# Patient Record
Sex: MALE | Race: WHITE | Employment: OTHER | URBAN - METROPOLITAN AREA
[De-identification: names, ages, dates, MRNs, and addresses within clinical notes are randomized per-mention and may not be internally consistent; named-entity substitution may affect disease eponyms.]

---

## 2018-02-15 ENCOUNTER — HOSPITAL ENCOUNTER (OUTPATIENT)
Dept: GENERAL RADIOLOGY | Age: 63
Discharge: HOME | End: 2018-02-15
Payer: MEDICARE

## 2018-02-15 ENCOUNTER — OFFICE VISIT (OUTPATIENT)
Dept: FAMILY MEDICINE CLINIC | Age: 63
End: 2018-02-15

## 2018-02-15 VITALS
SYSTOLIC BLOOD PRESSURE: 140 MMHG | DIASTOLIC BLOOD PRESSURE: 98 MMHG | TEMPERATURE: 98.4 F | HEART RATE: 90 BPM | OXYGEN SATURATION: 95 % | WEIGHT: 223.6 LBS | HEIGHT: 70 IN | BODY MASS INDEX: 32.01 KG/M2 | RESPIRATION RATE: 16 BRPM

## 2018-02-15 DIAGNOSIS — Z76.89 ESTABLISHING CARE WITH NEW DOCTOR, ENCOUNTER FOR: Primary | ICD-10-CM

## 2018-02-15 DIAGNOSIS — R05.9 COUGH: ICD-10-CM

## 2018-02-15 DIAGNOSIS — Z71.3 WEIGHT LOSS COUNSELING, ENCOUNTER FOR: ICD-10-CM

## 2018-02-15 DIAGNOSIS — I10 ESSENTIAL HYPERTENSION: ICD-10-CM

## 2018-02-15 DIAGNOSIS — F41.8 DEPRESSION WITH ANXIETY: ICD-10-CM

## 2018-02-15 DIAGNOSIS — Z86.73 HX OF ARTERIAL ISCHEMIC STROKE: ICD-10-CM

## 2018-02-15 DIAGNOSIS — E78.2 MIXED HYPERLIPIDEMIA: ICD-10-CM

## 2018-02-15 PROCEDURE — 71046 X-RAY EXAM CHEST 2 VIEWS: CPT

## 2018-02-15 RX ORDER — ATORVASTATIN CALCIUM 40 MG/1
TABLET, FILM COATED ORAL
Refills: 0 | COMMUNITY
Start: 2018-02-02 | End: 2018-05-11 | Stop reason: SDUPTHER

## 2018-02-15 RX ORDER — LEVOTHYROXINE SODIUM 75 UG/1
TABLET ORAL
Refills: 0 | COMMUNITY
Start: 2018-02-02 | End: 2018-06-13 | Stop reason: SDUPTHER

## 2018-02-15 RX ORDER — LORATADINE 10 MG/1
10 TABLET ORAL
COMMUNITY

## 2018-02-15 RX ORDER — LISINOPRIL 20 MG/1
TABLET ORAL
Refills: 0 | COMMUNITY
Start: 2018-02-01 | End: 2018-06-01 | Stop reason: SDUPTHER

## 2018-02-15 RX ORDER — GLUCOSAM/CHONDRO/HERB 149/HYAL 750-100 MG
TABLET ORAL
COMMUNITY
End: 2019-03-07

## 2018-02-15 RX ORDER — DULOXETIN HYDROCHLORIDE 60 MG/1
60 CAPSULE, DELAYED RELEASE ORAL DAILY
COMMUNITY
End: 2018-06-11

## 2018-02-15 RX ORDER — FLUTICASONE PROPIONATE 50 MCG
2 SPRAY, SUSPENSION (ML) NASAL DAILY
COMMUNITY

## 2018-02-15 RX ORDER — ASPIRIN 81 MG/1
81 TABLET ORAL DAILY
COMMUNITY

## 2018-02-15 NOTE — MR AVS SNAPSHOT
Stevie Patel [address] [facility] 203 400 [facility] 
[phone] Patient: Ubaldo Carranza MRN: [id number] CRN:[id number] Visit Information Date & Time Provider Department Dept. Phone Encounter #  
 2/15/2018 11:40 AM Justin Crawford MD [facility] at [address] [id number] Follow-up Instructions Return for meds, labs monitoring, depression. Upcoming Health Maintenance Date Due Hepatitis C Screening 1955 FOBT Q 1 YEAR AGE 50-75 7/2/2003 ZOSTER VACCINE AGE 60> 5/2/2013 Influenza Age 5 to Adult 8/1/2017 DTaP/Tdap/Td series (2 - Td) 10/11/2022 Allergies as of 2/15/2018  Review Complete On: 2/15/2018 By: Justin Crawford MD  
  
 Severity Noted Reaction Type Reactions Pcn [Penicillins] Low 11/05/2010   Side Effect Unknown (comments) Remembers something as a child about PCN Current Immunizations  Reviewed on 2/15/2018 Name Date Td 9/7/2004 Tdap 10/11/2012 Reviewed by Irma Cushing, LPN on 9/08/0381 at 88:74 PM  
You Were Diagnosed With   
  
 Codes Comments Establishing care with new doctor, encounter for    -  Primary ICD-10-CM: Z76.89 
ICD-9-CM: V65.8 Cough     ICD-10-CM: R05 ICD-9-CM: 786.2 Hx of arterial ischemic stroke     ICD-10-CM: Z86.73 
ICD-9-CM: V12.54 Essential hypertension     ICD-10-CM: I10 
ICD-9-CM: 401.9 Mixed hyperlipidemia     ICD-10-CM: E78.2 ICD-9-CM: 272.2 Depression with anxiety     ICD-10-CM: F41.8 ICD-9-CM: 300.4 Weight loss counseling, encounter for     ICD-10-CM: Z71.3 ICD-9-CM: V65.3 BMI 32.0-32.9,adult     ICD-10-CM: V86.14 
ICD-9-CM: V85.32 Vitals BP Pulse Temp Resp Height(growth percentile) Weight(growth percentile) (!) 140/98 (BP 1 Location: Left arm, BP Patient Position: Sitting) 90 98.4 °F (36.9 °C) (Oral) 16 5' 10\" (1.778 m) 223 lb 9.6 oz (101.4 kg) SpO2 BMI Smoking Status 95% 32.08 kg/m2 Never Smoker Vitals History BMI and BSA Data Body Mass Index Body Surface Area 32.08 kg/m 2 2.24 m 2 Preferred Pharmacy Pharmacy Name Phone [facility], [address] [facility] [phone] Your Updated Medication List  
  
   
This list is accurate as of: 2/15/18 12:30 PM.  Always use your most recent med list.  
  
  
  
  
 Waymond Fiddler Take 1 Tab by mouth as needed. aspirin delayed-release 81 mg tablet Take 81 mg by mouth daily. atorvastatin 40 mg tablet Commonly known as:  LIPITOR  
take 1 tablet by mouth once daily CLARITIN 10 mg tablet Generic drug:  loratadine Take 10 mg by mouth. DULoxetine 60 mg capsule Commonly known as:  CYMBALTA Take 60 mg by mouth daily. FISH OIL 1,000 mg (120 mg-180 mg) Cap Generic drug:  Omega-3-DHA-EPA-Fish Oil Take  by mouth. FLONASE ALLERGY RELIEF 50 mcg/actuation nasal spray Generic drug:  fluticasone 2 Sprays by Both Nostrils route daily. HYDROcodone-acetaminophen 5-500 mg per tablet Commonly known as:  Stevie Horde Take 1-2 Tabs by mouth every six (6) hours as needed for Pain.  
  
 levothyroxine 75 mcg tablet Commonly known as:  SYNTHROID  
take 1 tablet by mouth every morning ON AN EMPTY STOMACH  
  
 lisinopril 20 mg tablet Commonly known as:  PRINIVIL, ZESTRIL  
take 1 tablet by mouth once daily  
  
 multivitamin capsule Take 1 Cap by mouth daily. Follow-up Instructions Return for meds, labs monitoring, depression. To-Do List   
 02/15/2018 Imaging:  XR CHEST PA LAT Introducing \[facility] [place] [facility]\"" & HEALTH SERVICES! [facility] introduces [employer] patient portal. Now you can access parts of your medical record, email your doctor's office, and request medication refills online. 1. In your internet browser, go to [email or web address] 2. Click on the First Time User? Click Here link in the Sign In box. You will see the New Member Sign Up page. 3. Enter your Tout Access Code exactly as it appears below. You will not need to use this code after youve completed the sign-up process. If you do not sign up before the expiration date, you must request a new code. · Tout Access Code: [email or web address] Expires: 5/16/2018 12:30 PM 
 
4. Enter the last four digits of your Social Security Number (xxxx) and Date of Birth (mm/dd/yyyy) as indicated and click Submit. You will be taken to the next sign-up page. 5. Create a Tout ID. This will be your Tout login ID and cannot be changed, so think of one that is secure and easy to remember. 6. Create a Tout password. You can change your password at any time. 7. Enter your Password Reset Question and Answer. This can be used at a later time if you forget your password. 8. Enter your e-mail address. You will receive e-mail notification when new information is available in [address] 9. Click Sign Up. You can now view and download portions of your medical record. 10. Click the Download Summary menu link to download a portable copy of your medical information. If you have questions, please visit the Frequently Asked Questions section of the Tout website. Remember, Tout is NOT to be used for urgent needs. For medical emergencies, dial 911. Now available from your iPhone and Android! Please provide this summary of care documentation to your next provider. Your primary care clinician is listed as Adrianna Mandujano. If you have any questions after today's visit, please call [phone].

## 2018-02-15 NOTE — PROGRESS NOTES
Chief Complaint   Patient presents with    New Patient     Establish care. 1. Have you been to the ER, urgent care clinic since your last visit? Hospitalized since your last visit? no    2. Have you seen or consulted any other health care providers outside of the [address] [place] [address] since your last visit? Include any pap smears or colon screening.   no

## 2018-02-15 NOTE — PROGRESS NOTES
Candace Santiago is a 57 y.o. male, who's a new patient to our practice. He's present with his wife Mrs. Macy Parra. Previous PCP: Adriane Norton, he wanted to change  Last seen there: 3 months ago     has a past medical history of Depression with anxiety (2/15/2018); Essential hypertension (2/15/2018); arterial ischemic stroke (2/15/2018); Mixed hyperlipidemia (2/15/2018); Other ill-defined conditions(799.89) (2010); and Stroke [facility]). Chronic cough daily for years. Denies phlegm, fever, chills, CP, SOB, LANZA or orthopnea. Hx of year round allergies takes flonase and claritin    HTN: pt report usually at goal, b/c of new office visit, feels a bit anxious. Denies HA, CP, LANZA, SOB, Edema. Advice to monitor and f/u if not at goal.     HLD: report labs done about 3 months ago. We'll wait for record. Anxiety and depression: report duloxetine doesn't work for him. We'll f/u on this and discussed possible changes to meds at next visit. Denies SI or HI. Hx of stroke. He doesn't want any vaccination. Reviewed: active problem list, medication list, allergies, family history, social history, notes from last encounter, lab results    A comprehensive review of systems was negative except for that written in the HPI, on 14 ROS. Allergies   Allergen Reactions    Pcn [Penicillins] Unknown (comments)     Remembers something as a child about PCN     Current Outpatient Prescriptions on File Prior to Visit   Medication Sig Dispense Refill    multivitamin capsule Take 1 Cap by mouth daily.  NAPROXEN SODIUM (ALEVE PO) Take 1 Tab by mouth as needed.  hydrocodone-acetaminophen (LORTAB) 5-500 mg per tablet Take 1-2 Tabs by mouth every six (6) hours as needed for Pain. 40 Tab 0     No current facility-administered medications on file prior to visit.       Patient Active Problem List   Diagnosis Code    Depression with anxiety F41.8    Mixed hyperlipidemia E78.2    Essential hypertension I10    Hx of arterial ischemic stroke Z86.73       Visit Vitals    BP (!) 140/98 (BP 1 Location: Left arm, BP Patient Position: Sitting)    Pulse 90    Temp 98.4 °F (36.9 °C) (Oral)    Resp 16    Ht 5' 10\" (1.778 m)    Wt 223 lb 9.6 oz (101.4 kg)    SpO2 95%    BMI 32.08 kg/m2     General appearance: alert, cooperative, no distress, appears stated age  Neurologic: Alert and oriented X 3, normal strength and tone, symmetric. Normal without focal findings. Cranial nerves 2-12 intact. Normal coordination and gait. Mental status: Alert, oriented, thought content appropriate, affect: stable, mood-congruent. Head: Normocephalic, without obvious abnormality, atraumatic  Eyes: conjunctivae/corneas clear. PERRL, EOM's intact. Neck: supple, symmetrical, trachea midline, no JVD  Lungs: clear to auscultation bilaterally  Heart: regular rate and rhythm, S1, S2 normal, no murmur, click, rub or gallop  Abdomen: soft, non-tender. Extremities: extremities normal, atraumatic, no cyanosis or edema      Assessment/Plans:    Diagnoses and all orders for this visit:    1. Establishing care with new doctor, encounter for    2. Cough  -     XR CHEST PA LAT; Future    3. Hx of arterial ischemic stroke    4. Essential hypertension    5. Mixed hyperlipidemia    6. Depression with anxiety    7. Weight loss counseling, encounter for    8. BMI 32.0-32.9,adult      Discussed plans, risk/benefits of treatments/observations. Through the use of shared decision making, above plans were agreed upon. Medication compliance advised. Patient verbalized understanding. Follow-up Disposition:  Return for meds, labs monitoring, depression.       Zeus Lopez MD  2/18/2018

## 2018-02-21 ENCOUNTER — TELEPHONE (OUTPATIENT)
Dept: FAMILY MEDICINE CLINIC | Age: 63
End: 2018-02-21

## 2018-02-21 DIAGNOSIS — R05.9 COUGH: Primary | ICD-10-CM

## 2018-02-21 NOTE — TELEPHONE ENCOUNTER
Called pt cell and home, no answer left message. Spoke to his wife who's on his contact. Abnormal CXR  1. Low lung volumes  2. Elevation right hemidiaphragm could be due to paralysis of the diaphragm.   Fluoroscopy may yield more information    Refer to surjit Manley MD  2/21/2018

## 2018-05-15 RX ORDER — ATORVASTATIN CALCIUM 40 MG/1
TABLET, FILM COATED ORAL
Qty: 30 TAB | Refills: 0 | Status: SHIPPED | OUTPATIENT
Start: 2018-05-15 | End: 2018-06-13

## 2018-05-29 ENCOUNTER — TELEPHONE (OUTPATIENT)
Dept: FAMILY MEDICINE CLINIC | Age: 63
End: 2018-05-29

## 2018-05-29 NOTE — TELEPHONE ENCOUNTER
Pt wife stated that Dr. Kinga Fernandes spoke to patient and stated that he would put him on another depression medication. Pt stated the current medication is not working and pt has been out of mediation for over a week now. Would like to see if the doctor can call in a refill for the new medication. Please send medication to [facility] on [address].   Please contact Pt @ [phone]

## 2018-05-30 NOTE — TELEPHONE ENCOUNTER
MD Shruti Merrill LPN                             Please call pt in for appointment. I saw this patient once in February. For new patient, and especially for depression, I always have them f/u in 6 weeks.     It's now 4 months. Will not refill, he was supposed to f/u for reevaluation of his depression.      Dasia Chapman MD   5/30/2018       Lm for patient to call & schedule appt to discuss meds

## 2018-06-01 RX ORDER — LISINOPRIL 20 MG/1
TABLET ORAL
Qty: 30 TAB | Refills: 0 | Status: SHIPPED | OUTPATIENT
Start: 2018-06-01 | End: 2018-06-11

## 2018-06-11 ENCOUNTER — OFFICE VISIT (OUTPATIENT)
Dept: FAMILY MEDICINE CLINIC | Age: 63
End: 2018-06-11

## 2018-06-11 VITALS
RESPIRATION RATE: 18 BRPM | OXYGEN SATURATION: 95 % | DIASTOLIC BLOOD PRESSURE: 95 MMHG | WEIGHT: 222.4 LBS | SYSTOLIC BLOOD PRESSURE: 133 MMHG | HEIGHT: 70 IN | TEMPERATURE: 97.1 F | HEART RATE: 89 BPM | BODY MASS INDEX: 31.84 KG/M2

## 2018-06-11 DIAGNOSIS — E78.2 MIXED HYPERLIPIDEMIA: ICD-10-CM

## 2018-06-11 DIAGNOSIS — Z00.00 ANNUAL PHYSICAL EXAM: Primary | ICD-10-CM

## 2018-06-11 DIAGNOSIS — Z13.1 SCREENING FOR DIABETES MELLITUS: ICD-10-CM

## 2018-06-11 DIAGNOSIS — I10 ESSENTIAL HYPERTENSION: ICD-10-CM

## 2018-06-11 DIAGNOSIS — Z79.899 ENCOUNTER FOR LONG-TERM CURRENT USE OF MEDICATION: ICD-10-CM

## 2018-06-11 DIAGNOSIS — F41.8 DEPRESSION WITH ANXIETY: ICD-10-CM

## 2018-06-11 DIAGNOSIS — E03.9 ACQUIRED HYPOTHYROIDISM: ICD-10-CM

## 2018-06-11 RX ORDER — HYDROCHLOROTHIAZIDE 25 MG/1
25 TABLET ORAL DAILY
Qty: 30 TAB | Refills: 2 | Status: SHIPPED | OUTPATIENT
Start: 2018-06-11 | End: 2018-09-08 | Stop reason: SDUPTHER

## 2018-06-11 RX ORDER — METOPROLOL SUCCINATE 25 MG/1
25 TABLET, EXTENDED RELEASE ORAL DAILY
Qty: 30 TAB | Refills: 2 | Status: SHIPPED | OUTPATIENT
Start: 2018-06-11 | End: 2018-09-08 | Stop reason: SDUPTHER

## 2018-06-11 RX ORDER — SERTRALINE HYDROCHLORIDE 50 MG/1
50 TABLET, FILM COATED ORAL DAILY
Qty: 30 TAB | Refills: 2 | Status: SHIPPED | OUTPATIENT
Start: 2018-06-11 | End: 2018-09-08 | Stop reason: SDUPTHER

## 2018-06-11 NOTE — MR AVS SNAPSHOT
[place] [address] [facility] 
[phone] Patient: Bettie Rosas MRN: [id number] NNB:[id number] Visit Information Date & Time Provider Department Dept. Phone Encounter #  
 6/11/2018 10:40 AM Mary Araya MD [facility] at [address] [id number] Follow-up Instructions Return in about 4 weeks (around 7/9/2018) for HTN, Depression, labs. Upcoming Health Maintenance Date Due Hepatitis C Screening 1955 FOBT Q 1 YEAR AGE 50-75 7/2/2003 ZOSTER VACCINE AGE 60> 5/2/2013 Influenza Age 5 to Adult 8/1/2018 DTaP/Tdap/Td series (2 - Td) 10/11/2022 Allergies as of 6/11/2018  Review Complete On: 6/11/2018 By: Mary Araya MD  
  
 Severity Noted Reaction Type Reactions Lisinopril  06/11/2018    Cough Pcn [Penicillins] Low 11/05/2010   Side Effect Unknown (comments) Remembers something as a child about PCN Current Immunizations  Reviewed on 2/15/2018 Name Date Td 9/7/2004 Tdap 10/11/2012 Not reviewed this visit You Were Diagnosed With   
  
 Codes Comments Annual physical exam    -  Primary ICD-10-CM: Z00.00 ICD-9-CM: V70.0 Mixed hyperlipidemia     ICD-10-CM: E78.2 ICD-9-CM: 272.2 Essential hypertension     ICD-10-CM: I10 
ICD-9-CM: 401.9 Screening for diabetes mellitus     ICD-10-CM: Z13.1 ICD-9-CM: V77.1 Acquired hypothyroidism     ICD-10-CM: E03.9 ICD-9-CM: 244.9 Depression with anxiety     ICD-10-CM: F41.8 ICD-9-CM: 300.4 Encounter for long-term current use of medication     ICD-10-CM: Z79.899 ICD-9-CM: V58.69 Vitals BP Pulse Temp Resp Height(growth percentile) Weight(growth percentile) (!) 133/95 (BP 1 Location: Left arm, BP Patient Position: Sitting) 89 97.1 °F (36.2 °C) (Oral) 18 5' 10\" (1.778 m) 222 lb 6.4 oz (100.9 kg) SpO2 BMI Smoking Status 95% 31.91 kg/m2 Never Smoker BMI and BSA Data Body Mass Index Body Surface Area  
 31.91 kg/m 2 2.23 m 2 Preferred Pharmacy Pharmacy Name Phone [id number] [facility] [phone] Your Updated Medication List  
  
   
This list is accurate as of 6/11/18 11:15 AM.  Always use your most recent med list.  
  
  
  
  
 Erika Marrero Take 1 Tab by mouth as needed. aspirin delayed-release 81 mg tablet Take 81 mg by mouth daily. atorvastatin 40 mg tablet Commonly known as:  LIPITOR  
take 1 tablet by mouth once daily CLARITIN 10 mg tablet Generic drug:  loratadine Take 10 mg by mouth. FISH OIL 1,000 mg (120 mg-180 mg) capsule Generic drug:  omega 3-DHA-EPA-fish oil Take  by mouth. FLONASE ALLERGY RELIEF 50 mcg/actuation nasal spray Generic drug:  fluticasone 2 Sprays by Both Nostrils route daily. hydroCHLOROthiazide 25 mg tablet Commonly known as:  HYDRODIURIL Take 1 Tab by mouth daily. levothyroxine 75 mcg tablet Commonly known as:  SYNTHROID  
take 1 tablet by mouth every morning ON AN EMPTY STOMACH  
  
 metoprolol succinate 25 mg XL tablet Commonly known as:  TOPROL-XL Take 1 Tab by mouth daily. multivitamin capsule Take 1 Cap by mouth daily. sertraline 50 mg tablet Commonly known as:  ZOLOFT Take 1 Tab by mouth daily. Prescriptions Sent to Pharmacy Refills  
 metoprolol succinate (TOPROL-XL) 25 mg XL tablet 2 Sig: Take 1 Tab by mouth daily. Class: Normal  
 Pharmacy: [id number] [address] [place] [address] Ph #: [phone] Route: Oral  
 hydroCHLOROthiazide (HYDRODIURIL) 25 mg tablet 2 Sig: Take 1 Tab by mouth daily. Class: Normal  
 Pharmacy: [id number] [address] [place] [address] Ph #: [phone] Route: Oral  
 sertraline (ZOLOFT) 50 mg tablet 2 Sig: Take 1 Tab by mouth daily. Class: Normal  
 Pharmacy: [id number] #: [phone] Route: Oral  
  
We Performed the Following CBC W/O DIFF [02375 CPT(R)] HEMOGLOBIN A1C W/O EAG [47926 CPT(R)] LIPID PANEL [74026 CPT(R)] METABOLIC PANEL, COMPREHENSIVE [55682 CPT(R)] REFERRAL TO GASTROENTEROLOGY [NOC06 Custom] Comments:  
 Please evaluate patient for screening colonoscopy. TSH RFX ON ABNORMAL TO FREE T4 [[id number] Custom] URINALYSIS W/ RFLX MICROSCOPIC [37428 CPT(R)] Follow-up Instructions Return in about 4 weeks (around 7/9/2018) for HTN, Depression, labs. Referral Information Referral ID Referred By Referred To  
  
 [id number] Star Castellano MD   
   [facility] [email or web address] [address] [facility], [address] Phone: [phone] Fax: [phone] Visits Status Start Date End Date 1 New Request 6/11/18 6/11/19 If your referral has a status of pending review or denied, additional information will be sent to support the outcome of this decision. Introducing [facility] & HEALTH SERVICES! [facility] introduces [employer] patient portal. Now you can access parts of your medical record, email your doctor's office, and request medication refills online. 1. In your internet browser, go to [email or web address] 2. Click on the First Time User? Click Here link in the Sign In box. You will see the New Member Sign Up page. 3. Enter your [employer] Access Code exactly as it appears below. You will not need to use this code after youve completed the sign-up process. If you do not sign up before the expiration date, you must request a new code. · [employer] Access Code: [email or web address] Expires: 9/9/2018 11:14 AM 
 
4.  Enter the last four digits of your Social Security Number (xxxx) and Date of Birth (mm/dd/yyyy) as indicated and click Submit. You will be taken to the next sign-up page. 5. Create a [employer] Exchange ID. This will be your [employer] Exchange login ID and cannot be changed, so think of one that is secure and easy to remember. 6. Create a [employer] Exchange password. You can change your password at any time. 7. Enter your Password Reset Question and Answer. This can be used at a later time if you forget your password. 8. Enter your e-mail address. You will receive e-mail notification when new information is available in [address] 9. Click Sign Up. You can now view and download portions of your medical record. 10. Click the Download Summary menu link to download a portable copy of your medical information. If you have questions, please visit the Frequently Asked Questions section of the [employer] Exchange website. Remember, [employer] Exchange is NOT to be used for urgent needs. For medical emergencies, dial 911. Now available from your iPhone and Android! Please provide this summary of care documentation to your next provider. Your primary care clinician is listed as Oscar Aguero. If you have any questions after today's visit, please call [phone].

## 2018-06-11 NOTE — PROGRESS NOTES
Subjective:   Aura Carbone is a 57 y.o. male presenting for his annual checkup. 2nd visit with this physician.      has a past medical history of Acquired hypothyroidism (6/11/2018); Depression with anxiety (2/15/2018); Essential hypertension (2/15/2018); arterial ischemic stroke (2/15/2018); Mixed hyperlipidemia (2/15/2018); Other ill-defined conditions(799.89) (2010); and Stroke [facility]). Needs colonoscopy, report last one more than 10 years ago, was normal.     Chronic cough daily for years. Denies phlegm, fever, chills, CP, SOB, LANZA or orthopnea. Hx of year round allergies takes flonase and claritin. Last visit we did CXR and there were no acute findings. Maybe Lisinopril, we'll D/C it. Depression anxiety: he ran out of duloxetine $112, report feels gittery and weird. He didn't think it helps him too much anyway. He does have short temper, report mild depression. Denies SI or HI. He report that it's expensive and wanting to change it to what's on his list, he wanted to try sertraline. Wean down duloxetine to 1 tab every other day for a week. Start sertraline today.      HTN: BP still not at goal.  Currently on Lisinopril 20mg, maybe causing this chronic cough. We'll d/c. Add on HCTZ 25mg and Metoprolol XR 25mg. Denies HA, CP, LANZA, SOB, Edema.       HLD: report labs done about 3 months ago. We'll wait for record. Weight loss, can't control appetite, discussed 3 new meds should hold off until next time for topamax.      Hx of stroke. Denies LUTS. Didn't want PSA right now. ROS:  Feeling well. No dyspnea or chest pain on exertion. No abdominal pain, change in bowel habits, black or bloody stools. No urinary tract or prostatic symptoms. No neurological complaints.       Patient Active Problem List    Diagnosis Date Noted    Acquired hypothyroidism 06/11/2018    Depression with anxiety 02/15/2018    Mixed hyperlipidemia 02/15/2018    Essential hypertension 02/15/2018    Hx of arterial ischemic stroke 02/15/2018     Current Outpatient Prescriptions   Medication Sig Dispense Refill    metoprolol succinate (TOPROL-XL) 25 mg XL tablet Take 1 Tab by mouth daily. 30 Tab 2    hydroCHLOROthiazide (HYDRODIURIL) 25 mg tablet Take 1 Tab by mouth daily. 30 Tab 2    sertraline (ZOLOFT) 50 mg tablet Take 1 Tab by mouth daily. 30 Tab 2    atorvastatin (LIPITOR) 40 mg tablet take 1 tablet by mouth once daily 30 Tab 0    levothyroxine (SYNTHROID) 75 mcg tablet take 1 tablet by mouth every morning ON AN EMPTY STOMACH  0    Omega-3-DHA-EPA-Fish Oil (FISH OIL) 1,000 mg (120 mg-180 mg) cap Take  by mouth.  loratadine (CLARITIN) 10 mg tablet Take 10 mg by mouth.  aspirin delayed-release 81 mg tablet Take 81 mg by mouth daily.  fluticasone (FLONASE ALLERGY RELIEF) 50 mcg/actuation nasal spray 2 Sprays by Both Nostrils route daily.  multivitamin capsule Take 1 Cap by mouth daily.  NAPROXEN SODIUM (ALEVE PO) Take 1 Tab by mouth as needed. Allergies   Allergen Reactions    Lisinopril Cough    Pcn [Penicillins] Unknown (comments)     Remembers something as a child about PCN     Past Surgical History:   Procedure Laterality Date    HX BACK SURGERY  2003    ? repaired slipped disc    HX TONSILLECTOMY      childhood     Family History   Problem Relation Age of Onset    Diabetes Father     Heart Attack Father     Cancer Brother 48     prostate     Social History   Substance Use Topics    Smoking status: Never Smoker    Smokeless tobacco: Never Used    Alcohol use Yes      Comment: occasional          Objective:     Visit Vitals    BP (!) 133/95 (BP 1 Location: Left arm, BP Patient Position: Sitting)    Pulse 89    Temp 97.1 °F (36.2 °C) (Oral)    Resp 18    Ht 5' 10\" (1.778 m)    Wt 222 lb 6.4 oz (100.9 kg)    SpO2 95%    BMI 31.91 kg/m2     General:  Alert, cooperative, no distress, appears stated age.    Head:  Normocephalic, without obvious abnormality, atraumatic. Eyes:  Conjunctivae/corneas clear. PERRL, EOMs intact. Neck: Supple, symmetrical, trachea midline, no carotid bruit and no JVD. Lungs:   Clear to auscultation bilaterally. Heart:  Regular rate and rhythm, S1, S2 normal, no murmur, click, rub or gallop. Abdomen:   Soft, non-tender. Genitalia:  Refuse exam.     Extremities: Extremities normal, atraumatic, no cyanosis or edema. Pulses: 2+ and symmetric all extremities. Neurologic: CNII-XII intact. Normal strength, sensation and reflexes throughout. Assessment/Plan:     Diagnoses and all orders for this visit:    1. Annual physical exam  -     CBC W/O DIFF  -     HEMOGLOBIN A1C W/O EAG  -     LIPID PANEL  -     METABOLIC PANEL, COMPREHENSIVE  -     TSH RFX ON ABNORMAL TO FREE T4  -     URINALYSIS W/ RFLX MICROSCOPIC  -     REFERRAL TO GASTROENTEROLOGY    2. Mixed hyperlipidemia  -     LIPID PANEL    3. Essential hypertension  -     metoprolol succinate (TOPROL-XL) 25 mg XL tablet; Take 1 Tab by mouth daily. -     hydroCHLOROthiazide (HYDRODIURIL) 25 mg tablet; Take 1 Tab by mouth daily. 4. Screening for diabetes mellitus  -     HEMOGLOBIN A1C W/O EAG    5. Acquired hypothyroidism  -     TSH RFX ON ABNORMAL TO FREE T4    6. Depression with anxiety  -     sertraline (ZOLOFT) 50 mg tablet; Take 1 Tab by mouth daily. 7. Encounter for long-term current use of medication  -     CBC W/O DIFF  -     HEMOGLOBIN A1C W/O EAG  -     LIPID PANEL  -     METABOLIC PANEL, COMPREHENSIVE  -     TSH RFX ON ABNORMAL TO FREE T4  -     URINALYSIS W/ RFLX MICROSCOPIC      Follow-up Disposition:  Return in about 4 weeks (around 7/9/2018) for HTN, Depression, labs. Solis Tirado MD  6/11/2018

## 2018-06-11 NOTE — PROGRESS NOTES
Chief Complaint   Patient presents with    Hypertension     Check meds. Wants to change Cymbalta d/t cost.    1. Have you been to the ER, urgent care clinic since your last visit? Hospitalized since your last visit? no    2. Have you seen or consulted any other health care providers outside of the [address] [place] [address] since your last visit? Include any pap smears or colon screening.  Yes Neurologist

## 2018-06-12 LAB
ALBUMIN SERPL-MCNC: 4.5 G/DL (ref 3.6–4.8)
ALBUMIN/GLOB SERPL: 1.9 {RATIO} (ref 1.2–2.2)
ALP SERPL-CCNC: 114 IU/L (ref 39–117)
ALT SERPL-CCNC: 31 IU/L (ref 0–44)
APPEARANCE UR: ABNORMAL
AST SERPL-CCNC: 28 IU/L (ref 0–40)
BILIRUB SERPL-MCNC: 1 MG/DL (ref 0–1.2)
BILIRUB UR QL STRIP: NEGATIVE
BUN SERPL-MCNC: 15 MG/DL (ref 8–27)
BUN/CREAT SERPL: 14 (ref 10–24)
CALCIUM SERPL-MCNC: 9.6 MG/DL (ref 8.6–10.2)
CHLORIDE SERPL-SCNC: 100 MMOL/L (ref 96–106)
CHOLEST SERPL-MCNC: 180 MG/DL (ref 100–199)
CO2 SERPL-SCNC: 25 MMOL/L (ref 20–29)
COLOR UR: YELLOW
CREAT SERPL-MCNC: 1.04 MG/DL (ref 0.76–1.27)
ERYTHROCYTE [DISTWIDTH] IN BLOOD BY AUTOMATED COUNT: 15.3 % (ref 12.3–15.4)
GFR SERPLBLD CREATININE-BSD FMLA CKD-EPI: 76 ML/MIN/1.73
GFR SERPLBLD CREATININE-BSD FMLA CKD-EPI: 87 ML/MIN/1.73
GLOBULIN SER CALC-MCNC: 2.4 G/DL (ref 1.5–4.5)
GLUCOSE SERPL-MCNC: 103 MG/DL (ref 65–99)
GLUCOSE UR QL: NEGATIVE
HBA1C MFR BLD: 5.8 % (ref 4.8–5.6)
HCT VFR BLD AUTO: 51.1 % (ref 37.5–51)
HDLC SERPL-MCNC: 43 MG/DL
HGB BLD-MCNC: 17.1 G/DL (ref 13–17.7)
HGB UR QL STRIP: NEGATIVE
KETONES UR QL STRIP: ABNORMAL
LDLC SERPL CALC-MCNC: 116 MG/DL (ref 0–99)
LEUKOCYTE ESTERASE UR QL STRIP: NEGATIVE
MCH RBC QN AUTO: 30.2 PG (ref 26.6–33)
MCHC RBC AUTO-ENTMCNC: 33.5 G/DL (ref 31.5–35.7)
MCV RBC AUTO: 90 FL (ref 79–97)
MICRO URNS: ABNORMAL
NITRITE UR QL STRIP: NEGATIVE
PH UR STRIP: 5 [PH] (ref 5–7.5)
PLATELET # BLD AUTO: 338 X10E3/UL (ref 150–379)
POTASSIUM SERPL-SCNC: 4.9 MMOL/L (ref 3.5–5.2)
PROT SERPL-MCNC: 6.9 G/DL (ref 6–8.5)
PROT UR QL STRIP: ABNORMAL
RBC # BLD AUTO: 5.66 X10E6/UL (ref 4.14–5.8)
SODIUM SERPL-SCNC: 142 MMOL/L (ref 134–144)
SP GR UR: >=1.03 (ref 1–1.03)
TRIGL SERPL-MCNC: 103 MG/DL (ref 0–149)
TSH SERPL DL<=0.005 MIU/L-ACNC: 4.29 UIU/ML (ref 0.45–4.5)
UROBILINOGEN UR STRIP-MCNC: 1 MG/DL (ref 0.2–1)
VLDLC SERPL CALC-MCNC: 21 MG/DL (ref 5–40)
WBC # BLD AUTO: 10.2 X10E3/UL (ref 3.4–10.8)

## 2018-06-13 ENCOUNTER — TELEPHONE (OUTPATIENT)
Dept: FAMILY MEDICINE CLINIC | Age: 63
End: 2018-06-13

## 2018-06-13 DIAGNOSIS — E78.2 MIXED HYPERLIPIDEMIA: Primary | ICD-10-CM

## 2018-06-13 RX ORDER — LEVOTHYROXINE SODIUM 75 UG/1
TABLET ORAL
Qty: 90 TAB | Refills: 1 | Status: SHIPPED | OUTPATIENT
Start: 2018-06-13 | End: 2018-12-17 | Stop reason: SDUPTHER

## 2018-06-13 RX ORDER — SIMVASTATIN 40 MG/1
40 TABLET, FILM COATED ORAL
Qty: 90 TAB | Refills: 1 | Status: SHIPPED | OUTPATIENT
Start: 2018-06-13 | End: 2018-12-17 | Stop reason: SDUPTHER

## 2018-07-25 ENCOUNTER — OFFICE VISIT (OUTPATIENT)
Dept: FAMILY MEDICINE CLINIC | Age: 63
End: 2018-07-25

## 2018-07-25 VITALS
DIASTOLIC BLOOD PRESSURE: 93 MMHG | HEIGHT: 70 IN | WEIGHT: 223.6 LBS | HEART RATE: 92 BPM | TEMPERATURE: 98.7 F | SYSTOLIC BLOOD PRESSURE: 136 MMHG | RESPIRATION RATE: 18 BRPM | BODY MASS INDEX: 32.01 KG/M2 | OXYGEN SATURATION: 94 %

## 2018-07-25 DIAGNOSIS — R97.20 ELEVATED PSA: ICD-10-CM

## 2018-07-25 DIAGNOSIS — F41.8 DEPRESSION WITH ANXIETY: Primary | ICD-10-CM

## 2018-07-25 DIAGNOSIS — R53.82 CHRONIC FATIGUE: ICD-10-CM

## 2018-07-25 DIAGNOSIS — R73.03 PREDIABETES: ICD-10-CM

## 2018-07-25 DIAGNOSIS — R06.09 DOE (DYSPNEA ON EXERTION): ICD-10-CM

## 2018-07-25 RX ORDER — BUSPIRONE HYDROCHLORIDE 15 MG/1
15 TABLET ORAL
Qty: 60 TAB | Refills: 2 | Status: SHIPPED | OUTPATIENT
Start: 2018-07-25 | End: 2019-03-07

## 2018-07-25 NOTE — MR AVS SNAPSHOT
[address] [facility] 
[phone] Patient: Donnie Aceves MRN: [id number] FAH:[id number] Visit Information Date & Time Provider Department Dept. Phone Encounter #  
 7/25/2018  2:40 PM Edel Brown MD [facility] at [address] [id number] Follow-up Instructions Return in about 6 weeks (around 9/5/2018) for labs, anxiety, meds. Upcoming Health Maintenance Date Due Hepatitis C Screening 1955 FOBT Q 1 YEAR AGE 50-75 7/2/2003 ZOSTER VACCINE AGE 60> 5/2/2013 GLAUCOMA SCREENING Q2Y 7/2/2018 Pneumococcal 65+ Low/Medium Risk (1 of 2 - PCV13) 7/2/2018 Influenza Age 5 to Adult 8/1/2018 DTaP/Tdap/Td series (2 - Td) 10/11/2022 Allergies as of 7/25/2018  Review Complete On: 7/25/2018 By: Edel Brown MD  
  
 Severity Noted Reaction Type Reactions Lisinopril  06/11/2018    Cough Pcn [Penicillins] Low 11/05/2010   Side Effect Unknown (comments) Remembers something as a child about PCN Current Immunizations  Reviewed on 2/15/2018 Name Date Td 9/7/2004 Tdap 10/11/2012 Not reviewed this visit You Were Diagnosed With   
  
 Codes Comments Depression with anxiety    -  Primary ICD-10-CM: F41.8 ICD-9-CM: 300.4 LANZA (dyspnea on exertion)     ICD-10-CM: R06.09 
ICD-9-CM: 786.09 Prediabetes     ICD-10-CM: R73.03 
ICD-9-CM: 790.29 Elevated PSA     ICD-10-CM: R97.20 ICD-9-CM: 790.93 Chronic fatigue     ICD-10-CM: R53.82 
ICD-9-CM: 780.79 Vitals BP Pulse Temp Resp Height(growth percentile) Weight(growth percentile) (!) 136/93 (BP 1 Location: Left arm, BP Patient Position: Sitting) 92 98.7 °F (37.1 °C) (Oral) 18 5' 10\" (1.778 m) 223 lb 9.6 oz (101.4 kg) SpO2 BMI Smoking Status 94% 32.08 kg/m2 Never Smoker Vitals History BMI and BSA Data Body Mass Index Body Surface Area 32.08 kg/m 2 2.24 m 2 Preferred Pharmacy Pharmacy Name Phone [id number] [address] [place] [facility],[address] [phone] Your Updated Medication List  
  
   
This list is accurate as of 7/25/18  3:46 PM.  Always use your most recent med list.  
  
  
  
  
 aspirin delayed-release 81 mg tablet Take 81 mg by mouth daily. busPIRone 15 mg tablet Commonly known as:  BUSPAR Take 1 Tab by mouth two (2) times daily as needed. CLARITIN 10 mg tablet Generic drug:  loratadine Take 10 mg by mouth. EXCEDRIN MIGRAINE 250-250-65 mg per tablet Generic drug:  aspirin-acetaminophen-caffeine Take 2 Tabs by mouth every six (6) hours as needed for Headache. FISH OIL 1,000 mg (120 mg-180 mg) capsule Generic drug:  omega 3-DHA-EPA-fish oil Take  by mouth. FLONASE ALLERGY RELIEF 50 mcg/actuation nasal spray Generic drug:  fluticasone 2 Sprays by Both Nostrils route daily. hydroCHLOROthiazide 25 mg tablet Commonly known as:  HYDRODIURIL Take 1 Tab by mouth daily. levothyroxine 75 mcg tablet Commonly known as:  SYNTHROID  
take 1 tablet by mouth every morning ON AN EMPTY STOMACH  
  
 metoprolol succinate 25 mg XL tablet Commonly known as:  TOPROL-XL Take 1 Tab by mouth daily. multivitamin capsule Take 1 Cap by mouth daily. sertraline 50 mg tablet Commonly known as:  ZOLOFT Take 1 Tab by mouth daily. simvastatin 40 mg tablet Commonly known as:  ZOCOR Take 1 Tab by mouth nightly. Prescriptions Sent to Pharmacy Refills  
 busPIRone (BUSPAR) 15 mg tablet 2 Sig: Take 1 Tab by mouth two (2) times daily as needed. Class: Normal  
 Pharmacy: [id number] [address] [place] [address] #: [phone] Route: Oral  
  
We Performed the Following METABOLIC PANEL, COMPREHENSIVE [40192 CPT(R)] PSA W/ REFLX FREE PSA [17858 CPT(R)] TESTOSTERONE, FREE & TOTAL [42714 CPT(R)] VITAMIN D, 25 HYDROXY [id number] CPT(R)] Follow-up Instructions Return in about 6 weeks (around 9/5/2018) for labs, anxiety, meds. To-Do List   
 07/25/2018 ECG:  STRESS TEST CARDIAC Introducing [facility] SERVICES! Dear Napoleon Wilson: Thank you for requesting a [employer] account. Our records indicate that you already have an active [employer] account. You can access your account anytime at [email or web address]://[email or web address]; to (do) Centers. [employer]/for; to (do) Centers Did you know that you can access your hospital and ER discharge instructions at any time in [employer]? You can also review all of your test results from your hospital stay or ER visit. Additional Information If you have questions, please visit the Frequently Asked Questions section of the [employer] website at [email or web address]; to (do) Centers/. Remember, [employer] is NOT to be used for urgent needs. For medical emergencies, dial 911. Now available from your iPhone and Android! Please provide this summary of care documentation to your next provider. Your primary care clinician is listed as Enio Grant. If you have any questions after today's visit, please call [phone].

## 2018-07-25 NOTE — PROGRESS NOTES
Subjective:   Boone Huizar is a 70 y.o. male, present with his wife. 3rd visit with this physician.      has a past medical history of Acquired hypothyroidism (6/11/2018); Depression with anxiety (2/15/2018); Essential hypertension (2/15/2018); arterial ischemic stroke (2/15/2018); Mixed hyperlipidemia (2/15/2018); Other ill-defined conditions(799.89) (2010); and Stroke [facility]). Chronic cough daily for years. Denies phlegm, fever, chills, CP, SOB, LANZA or orthopnea. Hx of year round allergies takes flonase and claritin. Last visit we did CXR and there were no acute findings. Have seen pulm, was told due to weight and diagphram.    We've stop lisinopril, he report cough much better. Depression anxiety: Denies SI or HI  He have wean off duloxetine, but only started sertraline 1 week ago when he stop the duloxetine. He had 1 anxiety episode last week. He report about once a week. His wife would like to try something. Start buspar prn    LANZA for 1 year. Not acute or recent events. Denies CP. Currently denies CP, SOB, palpitation, diaphoresis, edema, orthopnea.       HTN: BP borderline high today. Last visit stop lisinopril, started HCTZ 25mg and Metoprolol XR 25mg. Denies HA, CP, LANZA, SOB, Edema. We'll continue to monitor. Chronic fatigue he report about a year. Denies CP, but SOB with exertional activities.      HLD: report labs done about 3 months ago. We'll wait for record. Weight loss, can't control appetite, discussed 3 new meds should hold off until next time for topamax.      Hx of stroke. nocturia. Was put on abx for prostatitis and was f/u with PSA, this was a few years ago. PSA      ROS:  Feeling well. No dyspnea or chest pain on exertion. No abdominal pain, change in bowel habits, black or bloody stools. No urinary tract or prostatic symptoms. No neurological complaints.       Patient Active Problem List    Diagnosis Date Noted    Acquired hypothyroidism 06/11/2018    Depression with anxiety 02/15/2018    Mixed hyperlipidemia 02/15/2018    Essential hypertension 02/15/2018    Hx of arterial ischemic stroke 02/15/2018     Current Outpatient Prescriptions   Medication Sig Dispense Refill    aspirin-acetaminophen-caffeine (EXCEDRIN MIGRAINE) 250-250-65 mg per tablet Take 2 Tabs by mouth every six (6) hours as needed for Headache.  busPIRone (BUSPAR) 15 mg tablet Take 1 Tab by mouth two (2) times daily as needed. 60 Tab 2    levothyroxine (SYNTHROID) 75 mcg tablet take 1 tablet by mouth every morning ON AN EMPTY STOMACH 90 Tab 1    simvastatin (ZOCOR) 40 mg tablet Take 1 Tab by mouth nightly. 90 Tab 1    metoprolol succinate (TOPROL-XL) 25 mg XL tablet Take 1 Tab by mouth daily. 30 Tab 2    hydroCHLOROthiazide (HYDRODIURIL) 25 mg tablet Take 1 Tab by mouth daily. 30 Tab 2    sertraline (ZOLOFT) 50 mg tablet Take 1 Tab by mouth daily. 30 Tab 2    Omega-3-DHA-EPA-Fish Oil (FISH OIL) 1,000 mg (120 mg-180 mg) cap Take  by mouth.  loratadine (CLARITIN) 10 mg tablet Take 10 mg by mouth.  aspirin delayed-release 81 mg tablet Take 81 mg by mouth daily.  fluticasone (FLONASE ALLERGY RELIEF) 50 mcg/actuation nasal spray 2 Sprays by Both Nostrils route daily.  multivitamin capsule Take 1 Cap by mouth daily. Allergies   Allergen Reactions    Lisinopril Cough    Pcn [Penicillins] Unknown (comments)     Remembers something as a child about PCN     Past Surgical History:   Procedure Laterality Date    HX BACK SURGERY  2003    ? repaired slipped disc    HX TONSILLECTOMY      childhood     Family History   Problem Relation Age of Onset    Diabetes Father     Heart Attack Father     Cancer Brother 48     prostate     Social History   Substance Use Topics    Smoking status: Never Smoker    Smokeless tobacco: Never Used    Alcohol use Yes      Comment: occasional          Objective:     Visit Vitals    BP (!) 136/93 (BP 1 Location: Left arm, BP Patient Position: Sitting)    Pulse 92    Temp 98.7 °F (37.1 °C) (Oral)    Resp 18    Ht 5' 10\" (1.778 m)    Wt 223 lb 9.6 oz (101.4 kg)    SpO2 94%    BMI 32.08 kg/m2     General:  Alert, cooperative, no distress, appears stated age. Head:  Normocephalic, without obvious abnormality, atraumatic. Eyes:  Conjunctivae/corneas clear. PERRL, EOMs intact. Neck: Supple, symmetrical, trachea midline, no carotid bruit and no JVD. Lungs:   Clear to auscultation bilaterally. Heart:  Regular rate and rhythm, S1, S2 normal, no murmur, click, rub or gallop. Abdomen:   Soft, non-tender. Genitalia:  Refuse exam.     Extremities: Extremities normal, atraumatic, no cyanosis or edema. Pulses: 2+ and symmetric all extremities. Neurologic: CNII-XII intact. Normal strength, sensation and reflexes throughout. Assessment/Plan:     Diagnoses and all orders for this visit:    1. Depression with anxiety  -     busPIRone (BUSPAR) 15 mg tablet; Take 1 Tab by mouth two (2) times daily as needed. 2. LANZA (dyspnea on exertion)  -     STRESS TEST CARDIAC; Future    3. Prediabetes    4. Elevated PSA  -     PSA W/ REFLX FREE PSA    5. Chronic fatigue  -     TESTOSTERONE, FREE & TOTAL  -     METABOLIC PANEL, COMPREHENSIVE  -     VITAMIN D, 25 HYDROXY      Follow-up Disposition:  Return in about 6 weeks (around 9/5/2018) for labs, anxiety, meds. Margaret Garcia MD  7/26/2018

## 2018-10-16 ENCOUNTER — OFFICE VISIT (OUTPATIENT)
Dept: FAMILY MEDICINE CLINIC | Age: 63
End: 2018-10-16

## 2018-10-16 VITALS
RESPIRATION RATE: 18 BRPM | SYSTOLIC BLOOD PRESSURE: 139 MMHG | WEIGHT: 226.2 LBS | TEMPERATURE: 97.9 F | BODY MASS INDEX: 32.38 KG/M2 | HEIGHT: 70 IN | HEART RATE: 83 BPM | DIASTOLIC BLOOD PRESSURE: 100 MMHG | OXYGEN SATURATION: 95 %

## 2018-10-16 DIAGNOSIS — R73.03 PREDIABETES: ICD-10-CM

## 2018-10-16 DIAGNOSIS — Z11.59 NEED FOR HEPATITIS C SCREENING TEST: ICD-10-CM

## 2018-10-16 DIAGNOSIS — F41.8 DEPRESSION WITH ANXIETY: ICD-10-CM

## 2018-10-16 DIAGNOSIS — R41.3 MEMORY DEFICIT: ICD-10-CM

## 2018-10-16 DIAGNOSIS — E03.9 ACQUIRED HYPOTHYROIDISM: ICD-10-CM

## 2018-10-16 DIAGNOSIS — E78.2 MIXED HYPERLIPIDEMIA: ICD-10-CM

## 2018-10-16 DIAGNOSIS — I10 ESSENTIAL HYPERTENSION: Primary | ICD-10-CM

## 2018-10-16 DIAGNOSIS — Z12.11 COLON CANCER SCREENING: ICD-10-CM

## 2018-10-16 DIAGNOSIS — Z23 ENCOUNTER FOR IMMUNIZATION: ICD-10-CM

## 2018-10-16 RX ORDER — METOPROLOL SUCCINATE 50 MG/1
50 TABLET, EXTENDED RELEASE ORAL DAILY
Qty: 90 TAB | Refills: 1 | Status: SHIPPED | OUTPATIENT
Start: 2018-10-16 | End: 2019-06-21 | Stop reason: SDUPTHER

## 2018-10-16 NOTE — PATIENT INSTRUCTIONS
Vaccine Information Statement Influenza (Flu) Vaccine (Inactivated or Recombinant): What you need to know Many Vaccine Information Statements are available in [place]i and other languages. See www.immunize.org/vis Hojas de Información Sobre Vacunas están disponibles en Español y en muchos otros idiomas. Visite www.immunize.org/vis 1. Why get vaccinated? Influenza (flu) is a contagious disease that spreads around the [place] every year, usually between October and May. Flu is caused by influenza viruses, and is spread mainly by coughing, sneezing, and close contact. Anyone can get flu. Flu strikes suddenly and can last several days. Symptoms vary by age, but can include: 
 fever/chills  sore throat  muscle aches  fatigue  cough  headache  runny or stuffy nose Flu can also lead to pneumonia and blood infections, and cause diarrhea and seizures in children. If you have a medical condition, such as heart or lung disease, flu can make it worse. Flu is more dangerous for some people. Infants and young children, people 72years of age and older, pregnant women, and people with certain health conditions or a weakened immune system are at greatest risk. Each year thousands of people in the [facility] die from flu, and many more are hospitalized. Flu vaccine can: 
 keep you from getting flu, 
 make flu less severe if you do get it, and 
 keep you from spreading flu to your family and other people. 2. Inactivated and recombinant flu vaccines A dose of flu vaccine is recommended every flu season. Children 6 months through 6years of age may need two doses during the same flu season. Everyone else needs only one dose each flu season.   
 
 
Some inactivated flu vaccines contain a very small amount of a mercury-based preservative called thimerosal. Studies have not shown thimerosal in vaccines to be harmful, but flu vaccines that do not contain thimerosal are available. There is no live flu virus in flu shots. They cannot cause the flu. There are many flu viruses, and they are always changing. Each year a new flu vaccine is made to protect against three or four viruses that are likely to cause disease in the upcoming flu season. But even when the vaccine doesnt exactly match these viruses, it may still provide some protection Flu vaccine cannot prevent: 
 flu that is caused by a virus not covered by the vaccine, or 
 illnesses that look like flu but are not. It takes about 2 weeks for protection to develop after vaccination, and protection lasts through the flu season. 3. Some people should not get this vaccine Tell the person who is giving you the vaccine:  If you have any severe, life-threatening allergies. If you ever had a life-threatening allergic reaction after a dose of flu vaccine, or have a severe allergy to any part of this vaccine, you may be advised not to get vaccinated. Most, but not all, types of flu vaccine contain a small amount of egg protein.  If you ever had Guillain-Barré Syndrome (also called GBS). Some people with a history of GBS should not get this vaccine. This should be discussed with your doctor.  If you are not feeling well. It is usually okay to get flu vaccine when you have a mild illness, but you might be asked to come back when you feel better. 4. Risks of a vaccine reaction With any medicine, including vaccines, there is a chance of reactions. These are usually mild and go away on their own, but serious reactions are also possible. Most people who get a flu shot do not have any problems with it. Minor problems following a flu shot include:  
 soreness, redness, or swelling where the shot was given  hoarseness  sore, red or itchy eyes  cough  fever  aches  headache  itching  fatigue If these problems occur, they usually begin soon after the shot and last 1 or 2 days. More serious problems following a flu shot can include the following:  There may be a small increased risk of Guillain-Barré Syndrome (GBS) after inactivated flu vaccine. This risk has been estimated at 1 or 2 additional cases per million people vaccinated. This is much lower than the risk of severe complications from flu, which can be prevented by flu vaccine.  Young children who get the flu shot along with pneumococcal vaccine (PCV13) and/or DTaP vaccine at the same time might be slightly more likely to have a seizure caused by fever. Ask your doctor for more information. Tell your doctor if a child who is getting flu vaccine has ever had a seizure. Problems that could happen after any injected vaccine:  People sometimes faint after a medical procedure, including vaccination. Sitting or lying down for about 15 minutes can help prevent fainting, and injuries caused by a fall. Tell your doctor if you feel dizzy, or have vision changes or ringing in the ears.  Some people get severe pain in the shoulder and have difficulty moving the arm where a shot was given. This happens very rarely.  Any medication can cause a severe allergic reaction. Such reactions from a vaccine are very rare, estimated at about 1 in a million doses, and would happen within a few minutes to a few hours after the vaccination. As with any medicine, there is a very remote chance of a vaccine causing a serious injury or death. The safety of vaccines is always being monitored. For more information, visit: www.cdc.gov/vaccinesafety/ 
 
5. What if there is a serious reaction? What should I look for?  Look for anything that concerns you, such as signs of a severe allergic reaction, very high fever, or unusual behavior.  
 
Signs of a severe allergic reaction can include hives, swelling of the face and throat, difficulty breathing, a fast heartbeat, dizziness, and weakness  usually within a few minutes to a few hours after the vaccination. What should I do?  If you think it is a severe allergic reaction or other emergency that cant wait, call 9-1-1 and get the person to the nearest hospital. Otherwise, call your doctor.  Reactions should be reported to the Vaccine Adverse Event Reporting System (VAERS). Your doctor should file this report, or you can do it yourself through  the VAERS web site at www.vaers. [facility].gov, or by calling 2-560.829.7388. VAERS does not give medical advice. 6. The National Vaccine Injury Compensation Program 
 
The [facility] Vaccine Injury Compensation Program (VICP) is a federal program that was created to compensate people who may have been injured by certain vaccines. Persons who believe they may have been injured by a vaccine can learn about the program and about filing a claim by calling 5-264.524.1422 or visiting the [employer] website at www.[facility].gov/vaccinecompensation. There is a time limit to file a claim for compensation. 7. How can I learn more?  Ask your healthcare provider. He or she can give you the vaccine package insert or suggest other sources of information.  Call your local or state health department.  Contact the Centers for Disease Control and Prevention (CDC): 
- Call 2-629.291.9539 (1-800-CDC-INFO) or 
- Visit CDCs website at www.cdc.gov/flu Vaccine Information Statement Inactivated Influenza Vaccine 8/7/2015 
42 URol[address] Laura Vargas 524RQ-30 Department of [facility] and The Online 401 Centers for Disease Control and Prevention Office Use Only Vaccine Information Statement Pneumococcal Conjugate Vaccine (PCV13): What You Need to Know Many Vaccine Information Statements are available in Citizen of [place] and other languages. See www.immunize.org/vis.  
Hojas de información Sobre Vacunas están disponibles en español y en wilmar otros idiomas. Visite www.immunize.org/vis. 1. Why get vaccinated? Vaccination can protect both children and adults from pneumococcal disease. Pneumococcal disease is caused by bacteria that can spread from person to person through close contact. It can cause ear infections, and it can also lead to more serious infections of the: 
 Lungs (pneumonia),  Blood (bacteremia), and 
 Covering of the brain and spinal cord (meningitis). Pneumococcal pneumonia is most common among adults. Pneumococcal meningitis can cause deafness and brain damage, and it kills about 1 child in 10 who get it. Anyone can get pneumococcal disease, but children under 3years of age and adults 72 years and older, people with certain medical conditions, and cigarette smokers are at the highest risk. Before there was a vaccine, the [facility] saw: 
 more than 700 cases of meningitis, 
 about 13,000 blood infections, 
 about 5 million ear infections, and 
 about 200 deaths 
in children under 5 each year from pneumococcal disease. Since vaccine became available, severe pneumococcal disease in these children has fallen by 88%. About 18,000 older adults die of pneumococcal disease each year in the [place]. Treatment of pneumococcal infections with penicillin and other drugs is not as effective as it used to be, because some strains of the disease have become resistant to these drugs. This makes prevention of the disease, through vaccination, even more important. 2. PCV13 vaccine Pneumococcal conjugate vaccine (called PCV13) protects against 13 types of pneumococcal bacteria. PCV13 is routinely given to children at 2, 4, 6, and 1515 months of age. It is also recommended for children and adults 3to 59years of age with certain health conditions, and for all adults 72years of age and older. Your doctor can give you details. 3. Some people should not get this vaccine Anyone who has ever had a life-threatening allergic reaction to a dose of this vaccine, to an earlier pneumococcal vaccine called PCV7, or to any vaccine containing diphtheria toxoid (for example, DTaP), should not get PCV13. Anyone with a severe allergy to any component of PCV13 should not get the vaccine. Tell your doctor if the person being vaccinated has any severe allergies. If the person scheduled for vaccination is not feeling well, your healthcare provider might decide to reschedule the shot on another day. 4. Risks of a vaccine reaction With any medicine, including vaccines, there is a chance of reactions. These are usually mild and go away on their own, but serious reactions are also possible. Problems reported following PCV13 varied by age and dose in the series. The most common problems reported among children were:  About half became drowsy after the shot, had a temporary loss of appetite, or had redness or tenderness where the shot was given.  About 1 out of 3 had swelling where the shot was given.  About 1 out of 3 had a mild fever, and about 1 in 20 had a fever over 102.2°F. 
 Up to about 8 out of 10 became fussy or irritable. Adults have reported pain, redness, and swelling where the shot was given; also mild fever, fatigue, headache, chills, or muscle pain. Zari Shirley children who get PCV13 along with inactivated flu vaccine at the same time may be at increased risk for seizures caused by fever. Ask your doctor for more information. Problems that could happen after any vaccine:  People sometimes faint after a medical procedure, including vaccination. Sitting or lying down for about 15 minutes can help prevent fainting, and injuries caused by a fall. Tell your doctor if you feel dizzy, or have vision changes or ringing in the ears.  
 
 Some older children and adults get severe pain in the shoulder and have difficulty moving the arm where a shot was given. This happens very rarely.  Any medication can cause a severe allergic reaction. Such reactions from a vaccine are very rare, estimated at about 1 in a million doses, and would happen within a few minutes to a few hours after the vaccination. As with any medicine, there is a very small chance of a vaccine causing a serious injury or death. The safety of vaccines is always being monitored. For more information, visit: www.cdc.gov/vaccinesafety/  
 
5. What if there is a serious reaction? What should I look for?  Look for anything that concerns you, such as signs of a severe allergic reaction, very high fever, or unusual behavior. Signs of a severe allergic reaction can include hives, swelling of the face and throat, difficulty breathing, a fast heartbeat, dizziness, and weakness  usually within a few minutes to a few hours after the vaccination. What should I do?  If you think it is a severe allergic reaction or other emergency that cant wait, call 9-1-1 or get the person to the nearest hospital. Otherwise, call your doctor. Reactions should be reported to the Vaccine Adverse Event Reporting System (VAERS). Your doctor should file this report, or you can do it yourself through the VAERS web site at www.vaers. [facility].gov, or by calling 6-914.360.3803. VAERS does not give medical advice. 6. The National Vaccine Injury Compensation Program 
 
The [facility] Vaccine Injury Compensation Program (VICP) is a federal program that was created to compensate people who may have been injured by certain vaccines. Persons who believe they may have been injured by a vaccine can learn about the program and about filing a claim by calling 0-888.218.7488 or visiting the [employer] website at www.[facility].gov/vaccinecompensation. There is a time limit to file a claim for compensation. 7. How can I learn more?  Ask your healthcare provider. He or she can give you the vaccine package insert or suggest other sources of information.  Call your local or state health department.  Contact the Centers for Disease Control and Prevention (CDC): 
- Call 0-233.298.6400 (1-800-CDC-INFO) or 
- Visit CDCs website at www.cdc.gov/vaccines Vaccine Information Statement PCV13 Vaccine 11/5/2015  
42 FREDIS Diaz 261CP-08 Department of Health and [employer] Centers for Disease Control and Prevention Office Use Only

## 2018-10-16 NOTE — PROGRESS NOTES
Subjective:  
Eugenio Estrada is a 70 y.o. male, present with his wife. 4th visit with this physician.  
 
 has a past medical history of Acquired hypothyroidism (6/11/2018); Depression with anxiety (2/15/2018); Essential hypertension (2/15/2018); arterial ischemic stroke (2/15/2018); Mixed hyperlipidemia (2/15/2018); Other ill-defined conditions(799.89) (2010); Prediabetes (10/16/2018); and Stroke [facility]). Since our last visit in July, he haven't done any labs work I ordered. Never went to get Cardiac stress test done. Didn't do colonoscopy. He decided he just wanted FIT. We'll order a FIT today. HTN: BP still high today, will up the metoprolol XL 25mg to 50mg every day. Depression anxiety: stable. buspar does help with his panic attacks. He only had to take it twice in the past 3 months. Denies SI or HI He have wean off duloxetine, but only started sertraline 1 week ago when he stop the duloxetine. He had 1 anxiety episode last week. He report about once a week. Currently denies CP, SOB. Again hx of LANZA > 1 year and didn't do stresstesting I ordered 3 months ago. Chronic fatigue he report about a year. Denies CP, but SOB with exertional activities. Never got the testosterone, tsh, vit d tested 
  
HLD: report labs done about 3 months ago. Weight loss, can't control appetite, topamax 25mg didn't seem to help.  
  
Hx of stroke. nocturia. Was put on abx for prostatitis and was f/u with PSA, this was a few years ago. PSA didn't check I saw his wife after his visit. He has a hx of CVA. She's unsure if it's his hearing or that he forgets. This has been > 1 year, slow progressive. He has a \"very nice hearing aid\", but he doesn't wears it. He have a neurologist at [facility] [place]. But wanted to see someone within [facility]. Wants a referral to neuro. ROS:  Feeling well. No dyspnea or chest pain on exertion.  No abdominal pain, change in bowel habits, black or bloody stools. No urinary tract or prostatic symptoms. No neurological complaints. Patient Active Problem List  
 Diagnosis Date Noted  Prediabetes 10/16/2018  Acquired hypothyroidism 06/11/2018  Depression with anxiety 02/15/2018  Mixed hyperlipidemia 02/15/2018  Essential hypertension 02/15/2018  Hx of arterial ischemic stroke 02/15/2018 Current Outpatient Prescriptions Medication Sig Dispense Refill  metoprolol succinate (TOPROL-XL) 50 mg XL tablet Take 1 Tab by mouth daily. 90 Tab 1  
 sertraline (ZOLOFT) 50 mg tablet TAKE ONE TABLET BY MOUTH ONE TIME DAILY 90 Tab 0  
 hydroCHLOROthiazide (HYDRODIURIL) 25 mg tablet TAKE ONE TABLET BY MOUTH ONE TIME DAILY 90 Tab 1  
 aspirin-acetaminophen-caffeine (EXCEDRIN MIGRAINE) 250-250-65 mg per tablet Take 2 Tabs by mouth every six (6) hours as needed for Headache.  levothyroxine (SYNTHROID) 75 mcg tablet take 1 tablet by mouth every morning ON AN EMPTY STOMACH 90 Tab 1  
 simvastatin (ZOCOR) 40 mg tablet Take 1 Tab by mouth nightly. 90 Tab 1  
 Omega-3-DHA-EPA-Fish Oil (FISH OIL) 1,000 mg (120 mg-180 mg) cap Take  by mouth.  loratadine (CLARITIN) 10 mg tablet Take 10 mg by mouth.  aspirin delayed-release 81 mg tablet Take 81 mg by mouth daily.  fluticasone (FLONASE ALLERGY RELIEF) 50 mcg/actuation nasal spray 2 Sprays by Both Nostrils route daily.  multivitamin capsule Take 1 Cap by mouth daily.  busPIRone (BUSPAR) 15 mg tablet Take 1 Tab by mouth two (2) times daily as needed. 60 Tab 2 Allergies Allergen Reactions  Lisinopril Cough  Pcn [Penicillins] Unknown (comments) Remembers something as a child about PCN Past Surgical History:  
Procedure Laterality Date  HX BACK SURGERY  2003 ? repaired slipped disc  HX TONSILLECTOMY childhood Family History Problem Relation Age of Onset  Diabetes Father  Heart Attack Father  Cancer Brother 48  
  prostate Social History Substance Use Topics  Smoking status: Never Smoker  Smokeless tobacco: Never Used  Alcohol use Yes Comment: occasional  
  
 
 
Objective:  
 
Visit Vitals  BP (!) 139/100 (BP 1 Location: Left arm, BP Patient Position: Sitting)  Pulse 83  Temp 97.9 °F (36.6 °C) (Oral)  Resp 18  Ht 5' 10\" (1.778 m)  Wt 226 lb 3.2 oz (102.6 kg)  SpO2 95%  BMI 32.46 kg/m2 General:  Alert, cooperative, no distress, appears stated age. Head:  Normocephalic, without obvious abnormality, atraumatic. Eyes:  Conjunctivae/corneas clear. PERRL, EOMs intact. Neck: Supple, symmetrical, trachea midline, no carotid bruit and no JVD. Lungs:   Clear to auscultation bilaterally. Heart:  Regular rate and rhythm, S1, S2 normal, no murmur, click, rub or gallop. Abdomen:   Soft, non-tender. Extremities: Extremities normal, atraumatic, no cyanosis or edema. Pulses: 2+ and symmetric all extremities. Neurologic: CNII-XII intact. Normal strength, sensation and reflexes throughout. Assessment/Plan:  
 
i've reprinted previous ordered labs and stress test for him. Diagnoses and all orders for this visit: 1. Essential hypertension 
-     metoprolol succinate (TOPROL-XL) 50 mg XL tablet; Take 1 Tab by mouth daily. 2. Acquired hypothyroidism 3. Depression with anxiety 4. Mixed hyperlipidemia 5. Prediabetes 
-     HEMOGLOBIN A1C W/O EAG 6. Need for hepatitis C screening test 
-     HEPATITIS C AB 
 
7. Colon cancer screening -     OCCULT BLOOD IMMUNOASSAY,DIAGNOSTIC 8. Memory deficit 
-     REFERRAL TO NEUROLOGY 9. Encounter for immunization -     Influenza Vaccine Inactivated (IIV)(FLUAD), Subunit, Adjuvanted, IM, (73985) -     Pneumococcal Conjugate vaccine - 13 valent (50 years and older) -     Administration fee () for Medicare insured patients -     [address] Medicare Injection Admin Charge Follow-up Disposition: 
Return in about 2 months (around 12/16/2018) for HTN, sooner if labs abnormal.. Reinaldo Arechiga MD 
10/16/2018

## 2018-10-16 NOTE — PROGRESS NOTES
Chief Complaint Patient presents with  Hypertension  
 
 check meds & labs. 1. Have you been to the ER, urgent care clinic since your last visit? Hospitalized since your last visit? no 
 
2. Have you seen or consulted any other health care providers outside of the [address] [place] [address] since your last visit? Include any pap smears or colon screening. no 
 
 
Order placed for [employer], per Verbal Order from Dr. Micheal Grissom on 10/16/2018 due to need for immunization. Shruti Felixr is a 70 y.o. male who presents for routine immunizations. He denies any symptoms , reactions or allergies that would exclude them from being immunized today. Risks and adverse reactions were discussed and the VIS was given to them. All questions were addressed. He was observed for 15 min post injection. There were no reactions observed.  
 
Chris Sanchez LPN

## 2018-10-16 NOTE — MR AVS SNAPSHOT
Stevie Patel [address] [facility] 
[phone] Patient: Pavel Red MRN: [id number] ING:3/8/1804 Visit Information Date & Time Provider Department Dept. Phone Encounter #  
 10/16/2018 12:40 PM Reese Dailey MD [facility] at [address] [id number] Follow-up Instructions Return in about 2 months (around 12/16/2018) for HTN, sooner if labs abnormal. Upcoming Health Maintenance Date Due Hepatitis C Screening 1955 Shingrix Vaccine Age 50> (1 of 2) 7/2/2003 FOBT Q 1 YEAR AGE 50-75 7/2/2003 GLAUCOMA SCREENING Q2Y 7/2/2018 Pneumococcal 65+ Low/Medium Risk (1 of 2 - PCV13) 7/2/2018 Influenza Age 5 to Adult 8/1/2018 DTaP/Tdap/Td series (2 - Td) 10/11/2022 Allergies as of 10/16/2018  Review Complete On: 10/16/2018 By: Julissa Urrutia LPN Severity Noted Reaction Type Reactions Lisinopril  06/11/2018    Cough Pcn [Penicillins] Low 11/05/2010   Side Effect Unknown (comments) Remembers something as a child about PCN Current Immunizations  Reviewed on 2/15/2018 Name Date Td 9/7/2004 Tdap 10/11/2012 Not reviewed this visit You Were Diagnosed With   
  
 Codes Comments Essential hypertension    -  Primary ICD-10-CM: I10 
ICD-9-CM: 401.9 Acquired hypothyroidism     ICD-10-CM: E03.9 ICD-9-CM: 244.9 Depression with anxiety     ICD-10-CM: F41.8 ICD-9-CM: 300.4 Mixed hyperlipidemia     ICD-10-CM: E78.2 ICD-9-CM: 272.2 Prediabetes     ICD-10-CM: R73.03 
ICD-9-CM: 790.29 Need for hepatitis C screening test     ICD-10-CM: Z11.59 
ICD-9-CM: V73.89 Colon cancer screening     ICD-10-CM: Z12.11 ICD-9-CM: V76.51 Vitals BP Pulse Temp Resp Height(growth percentile) Weight(growth percentile)  (!) 139/100 (BP 1 Location: Left arm, BP Patient Position: Sitting) 83 97.9 °F (36.6 °C) (Oral) 18 5' 10\" (1.778 m) 226 lb 3.2 oz (102.6 kg) SpO2 BMI Smoking Status 95% 32.46 kg/m2 Never Smoker Vitals History BMI and BSA Data Body Mass Index Body Surface Area  
 32.46 kg/m 2 2.25 m 2 Preferred Pharmacy Pharmacy Name Phone [id number] [address] [facility] [phone] Your Updated Medication List  
  
   
This list is accurate as of 10/16/18  1:28 PM.  Always use your most recent med list.  
  
  
  
  
 aspirin delayed-release 81 mg tablet Take 81 mg by mouth daily. busPIRone 15 mg tablet Commonly known as:  BUSPAR Take 1 Tab by mouth two (2) times daily as needed. CLARITIN 10 mg tablet Generic drug:  loratadine Take 10 mg by mouth. EXCEDRIN MIGRAINE 250-250-65 mg per tablet Generic drug:  aspirin-acetaminophen-caffeine Take 2 Tabs by mouth every six (6) hours as needed for Headache. FISH OIL 1,000 mg (120 mg-180 mg) capsule Generic drug:  omega 3-DHA-EPA-fish oil Take  by mouth. FLONASE ALLERGY RELIEF 50 mcg/actuation nasal spray Generic drug:  fluticasone 2 Sprays by Both Nostrils route daily. hydroCHLOROthiazide 25 mg tablet Commonly known as:  HYDRODIURIL  
TAKE ONE TABLET BY MOUTH ONE TIME DAILY  
  
 levothyroxine 75 mcg tablet Commonly known as:  SYNTHROID  
take 1 tablet by mouth every morning ON AN EMPTY STOMACH  
  
 metoprolol succinate 50 mg XL tablet Commonly known as:  TOPROL-XL Take 1 Tab by mouth daily. multivitamin capsule Take 1 Cap by mouth daily. sertraline 50 mg tablet Commonly known as:  ZOLOFT  
TAKE ONE TABLET BY MOUTH ONE TIME DAILY  
  
 simvastatin 40 mg tablet Commonly known as:  ZOCOR Take 1 Tab by mouth nightly. Prescriptions Sent to Pharmacy Refills  
 metoprolol succinate (TOPROL-XL) 50 mg XL tablet 1 Sig: Take 1 Tab by mouth daily.   
 Class: Normal  
 [facility]  #: [phone] Route: Oral  
  
We Performed the Following HEMOGLOBIN A1C W/O EAG [02443 CPT(R)] HEPATITIS C AB [90980 CPT(R)] OCCULT BLOOD IMMUNOASSAY,DIAGNOSTIC [38220 CPT(R)] Follow-up Instructions Return in about 2 months (around 12/16/2018) for HTN, sooner if labs abnormal.  
  
  
Introducing [facility]! Dear Gwen Ochoa: Thank you for requesting a [employer] account. Our records indicate that you already have an active [employer] account. You can access your account anytime at [email or web address] Did you know that you can access your hospital and ER discharge instructions at any time in [employer]? You can also review all of your test results from your hospital stay or ER visit. Additional Information If you have questions, please visit the Frequently Asked Questions section of the [employer] website at [email or web address] Remember, [employer] is NOT to be used for urgent needs. For medical emergencies, dial 911. Now available from your iPhone and Android! Please provide this summary of care documentation to your next provider. Your primary care clinician is listed as Ana Powell. If you have any questions after today's visit, please call [phone].

## 2018-10-16 NOTE — LETTER
11/11/2018 7:03 PM 
 
Mr. Katheryn Hashimoto [id number] Dear Eugenio Estrada: Please find your most recent results below. Your testosterone level is low. Please f/u for further testing and treatment. Your other labs were fine. Resulted Orders METABOLIC PANEL, COMPREHENSIVE Result Value Ref Range Glucose 98 65 - 99 mg/dL BUN 14 8 - 27 mg/dL Creatinine 0.76 0.76 - 1.27 mg/dL GFR est non-AA 96 >59 mL/min/1.73 GFR est  >59 mL/min/1.73  
 BUN/Creatinine ratio 18 10 - 24 Sodium 143 134 - 144 mmol/L Potassium 4.5 3.5 - 5.2 mmol/L Chloride 102 96 - 106 mmol/L  
 CO2 25 20 - 29 mmol/L Calcium 9.2 8.6 - 10.2 mg/dL Protein, total 6.7 6.0 - 8.5 g/dL Albumin 4.1 3.6 - 4.8 g/dL GLOBULIN, TOTAL 2.6 1.5 - 4.5 g/dL A-G Ratio 1.6 1.2 - 2.2 Bilirubin, total 0.8 0.0 - 1.2 mg/dL Alk. phosphatase 98 39 - 117 IU/L  
 AST (SGOT) 27 0 - 40 IU/L  
 ALT (SGPT) 48 (H) 0 - 44 IU/L Narrative Performed at:  [address]  [id number] : Sergio Rogers MD, Phone:  [phone] TESTOSTERONE, FREE & TOTAL Result Value Ref Range Testosterone 93 (L) 264 - 916 ng/dL Comment:  
   Adult male reference interval is based on a population of 
healthy nonobese males (BMI <30) between 23and 44years old. [address] [place] [address], [address] [id number]. PMID: [id number]. Free testosterone (Direct) 1.9 (L) 6.6 - 18.1 pg/mL Narrative Performed at:  [address]  [id number] : Sergio Rogers MD, Phone:  [phone] PSA W/ REFLX FREE PSA Result Value Ref Range Prostate Specific Ag 3.1 0.0 - 4.0 ng/mL Comment:  
   Roche ECLIA methodology.  
According to the American Urological Association, Serum PSA should 
decrease and remain at undetectable levels after radical 
 prostatectomy. The AUA defines biochemical recurrence as an initial 
PSA value 0.2 ng/mL or greater followed by a subsequent confirmatory PSA value 0.2 ng/mL or greater. Values obtained with different assay methods or kits cannot be used 
interchangeably. Results cannot be interpreted as absolute evidence 
of the presence or absence of malignant disease. Reflex Criteria Comment Comment:  
   The percent free PSA is performed on a reflex basis only when the 
total PSA is between 4.0 and 10.0 ng/mL. Narrative Performed at:  [address]  [id number] : Carito Tirado MD, Phone:  [phone] HEMOGLOBIN A1C W/O EAG Result Value Ref Range Hemoglobin A1c 5.6 4.8 - 5.6 % Comment:  
            Prediabetes: 5.7 - 6.4 Diabetes: >6.4 Glycemic control for adults with diabetes: <7.0 Narrative Performed at:  [address]  [id number] : Carito Tirado MD, Phone:  [phone] VITAMIN D, 25 HYDROXY Result Value Ref Range VITAMIN D, 25-HYDROXY 40.9 30.0 - 100.0 ng/mL Comment:  
   Vitamin D deficiency has been defined by the [address] [place] [address] practice guideline as a 
level of serum 25-OH vitamin D less than 20 ng/mL (1,2). The Endocrine Society went on to further define vitamin D 
insufficiency as a level between 21 and 29 ng/mL (2). 1. IOM ([place] of Medicine). 2010. Dietary reference 
   intakes for calcium and D. [facility]: The 
   [employer]. 2. Isabell MF, Delfina BE, Fatemeh ALTAMIRANO, et al. 
   Evaluation, treatment, and prevention of vitamin D 
   deficiency: an Endocrine Society clinical practice 
   guideline. JCEM. 2011 Jul; 96(7):1911-30. Narrative Performed at:  [address]  [id number] : Lisa Cowan MD, Phone:  [phone] HEPATITIS C AB Result Value Ref Range Hep C Virus Ab <0.1 0.0 - 0.9 s/co ratio Comment:  
                                     Negative:     < 0.8 Indeterminate: 0.8 - 0.9 Positive:     > 0.9 The CDC recommends that a positive HCV antibody result 
 be followed up with a HCV Nucleic Acid Amplification 
 test (532518). Narrative Performed at:  [address]  [id number] : Lisa Cowan MD, Phone:  [phone] Please call me if you have any questions: [phone] Sincerely, Rory Pierce MD

## 2018-10-17 LAB
25(OH)D3+25(OH)D2 SERPL-MCNC: 40.9 NG/ML (ref 30–100)
ALBUMIN SERPL-MCNC: 4.1 G/DL (ref 3.6–4.8)
ALBUMIN/GLOB SERPL: 1.6 {RATIO} (ref 1.2–2.2)
ALP SERPL-CCNC: 98 IU/L (ref 39–117)
ALT SERPL-CCNC: 48 IU/L (ref 0–44)
AST SERPL-CCNC: 27 IU/L (ref 0–40)
BILIRUB SERPL-MCNC: 0.8 MG/DL (ref 0–1.2)
BUN SERPL-MCNC: 14 MG/DL (ref 8–27)
BUN/CREAT SERPL: 18 (ref 10–24)
CALCIUM SERPL-MCNC: 9.2 MG/DL (ref 8.6–10.2)
CHLORIDE SERPL-SCNC: 102 MMOL/L (ref 96–106)
CO2 SERPL-SCNC: 25 MMOL/L (ref 20–29)
CREAT SERPL-MCNC: 0.76 MG/DL (ref 0.76–1.27)
GLOBULIN SER CALC-MCNC: 2.6 G/DL (ref 1.5–4.5)
GLUCOSE SERPL-MCNC: 98 MG/DL (ref 65–99)
HBA1C MFR BLD: 5.6 % (ref 4.8–5.6)
HCV AB S/CO SERPL IA: <0.1 S/CO RATIO (ref 0–0.9)
POTASSIUM SERPL-SCNC: 4.5 MMOL/L (ref 3.5–5.2)
PROT SERPL-MCNC: 6.7 G/DL (ref 6–8.5)
PSA SERPL-MCNC: 3.1 NG/ML (ref 0–4)
REFLEX CRITERIA: NORMAL
SODIUM SERPL-SCNC: 143 MMOL/L (ref 134–144)
TESTOST FREE SERPL-MCNC: 1.9 PG/ML (ref 6.6–18.1)
TESTOST SERPL-MCNC: 93 NG/DL (ref 264–916)

## 2018-12-17 DIAGNOSIS — F41.8 DEPRESSION WITH ANXIETY: ICD-10-CM

## 2018-12-17 DIAGNOSIS — E78.2 MIXED HYPERLIPIDEMIA: ICD-10-CM

## 2018-12-17 RX ORDER — SERTRALINE HYDROCHLORIDE 50 MG/1
TABLET, FILM COATED ORAL
Qty: 90 TAB | Refills: 0 | Status: SHIPPED | OUTPATIENT
Start: 2018-12-17 | End: 2019-03-07

## 2018-12-17 RX ORDER — LEVOTHYROXINE SODIUM 75 UG/1
TABLET ORAL
Qty: 90 TAB | Refills: 1 | Status: SHIPPED | OUTPATIENT
Start: 2018-12-17 | End: 2019-06-21 | Stop reason: SDUPTHER

## 2018-12-17 RX ORDER — SIMVASTATIN 40 MG/1
TABLET, FILM COATED ORAL
Qty: 90 TAB | Refills: 1 | Status: SHIPPED | OUTPATIENT
Start: 2018-12-17 | End: 2019-06-21 | Stop reason: SDUPTHER

## 2019-03-07 ENCOUNTER — OFFICE VISIT (OUTPATIENT)
Dept: FAMILY MEDICINE CLINIC | Age: 64
End: 2019-03-07

## 2019-03-07 VITALS
WEIGHT: 229.6 LBS | OXYGEN SATURATION: 94 % | SYSTOLIC BLOOD PRESSURE: 125 MMHG | DIASTOLIC BLOOD PRESSURE: 96 MMHG | HEIGHT: 70 IN | TEMPERATURE: 97.9 F | BODY MASS INDEX: 32.87 KG/M2 | HEART RATE: 89 BPM | RESPIRATION RATE: 16 BRPM

## 2019-03-07 DIAGNOSIS — Z71.3 WEIGHT LOSS COUNSELING, ENCOUNTER FOR: ICD-10-CM

## 2019-03-07 DIAGNOSIS — F41.8 DEPRESSION WITH ANXIETY: Primary | ICD-10-CM

## 2019-03-07 DIAGNOSIS — Z79.899 ENCOUNTER FOR LONG-TERM (CURRENT) USE OF MEDICATIONS: ICD-10-CM

## 2019-03-07 DIAGNOSIS — E29.1 HYPOGONADISM IN MALE: ICD-10-CM

## 2019-03-07 RX ORDER — CLONAZEPAM 0.5 MG/1
0.5 TABLET ORAL
Qty: 15 TAB | Refills: 0 | Status: SHIPPED | OUTPATIENT
Start: 2019-03-07 | End: 2019-06-21

## 2019-03-07 RX ORDER — TOPIRAMATE 25 MG/1
25 TABLET ORAL 2 TIMES DAILY WITH MEALS
Qty: 60 TAB | Refills: 2 | Status: SHIPPED | OUTPATIENT
Start: 2019-03-07 | End: 2019-05-25 | Stop reason: SDUPTHER

## 2019-03-07 RX ORDER — SERTRALINE HYDROCHLORIDE 100 MG/1
100 TABLET, FILM COATED ORAL DAILY
Qty: 90 TAB | Refills: 1 | Status: SHIPPED | OUTPATIENT
Start: 2019-03-07 | End: 2019-06-21 | Stop reason: SDUPTHER

## 2019-03-07 NOTE — PROGRESS NOTES
Chief Complaint   Patient presents with   [facility] Annual Wellness Visit         1. Have you been to the ER, urgent care clinic since your last visit? Hospitalized since your last visit? no    2. Have you seen or consulted any other health care providers outside of the [address] [place] [address] since your last visit? Include any pap smears or colon screening.  no

## 2019-03-07 NOTE — PROGRESS NOTES
Subjective:   Winthrop Paget is a 70 y.o. male, present with his wife. 4th visit with this physician.      has a past medical history of Acquired hypothyroidism (6/11/2018), Depression with anxiety (2/15/2018), Essential hypertension (2/15/2018), arterial ischemic stroke (2/15/2018), Mixed hyperlipidemia (2/15/2018), Other ill-defined conditions(799.89) (2010), Prediabetes (10/16/2018), and Stroke ([facility] [address] Did not do his FIT test.     HTN: BP better today, continue course     Depression anxiety: He report being stable, but says buspar \"made me feels weird\". His wife discussed with me in private about his short temper, and stress. Denies SI or HI  zoloft 50mg. We'll up zoloft to 100mg  Write for clonazepam 0.5mg #15. Discussed risk for addiction, etc, plans for short term use. Low testosterone. NOrmal TSH and Vit D  We'll get 2nd confirm low T.      Weight loss, can't control appetite, topamax 25mg didn't seem to help. Discussed the patient's BMI with him. The BMI follow up plan is as follows:   dietary management education, guidance, and counseling encourage exercise monitor weight prescribed dietary intake      HLD: report labs done about 3 months ago.      nocturia. Was put on abx for prostatitis and was f/u with PSA, this was a few years ago. PSA didn't check     He has a hx of CVA. She's unsure if it's his hearing or that he forgets. This has been > 1 year, slow progressive. He has a \"very nice hearing aid\", but he doesn't wears it. He have a neurologist at [facility] [place]. But [facility] within [facility]. Wants a referral to neuro. ROS:  Feeling well. No dyspnea or chest pain on exertion. No abdominal pain, change in bowel habits, black or bloody stools. No urinary tract or prostatic symptoms. No neurological complaints.       Patient Active Problem List    Diagnosis Date Noted    Prediabetes 10/16/2018    Acquired hypothyroidism 06/11/2018    Depression with anxiety 02/15/2018    Mixed hyperlipidemia 02/15/2018    Essential hypertension 02/15/2018    Hx of arterial ischemic stroke 02/15/2018     Current Outpatient Medications   Medication Sig Dispense Refill    clonazePAM (KLONOPIN) 0.5 mg tablet Take 1 Tab by mouth nightly as needed (anxiety). Max Daily Amount: 0.5 mg. 15 Tab 0    sertraline (ZOLOFT) 100 mg tablet Take 1 Tab by mouth daily. 90 Tab 1    topiramate (TOPAMAX) 25 mg tablet Take 1 Tab by mouth two (2) times daily (with meals). 60 Tab 2    levothyroxine (SYNTHROID) 75 mcg tablet TAKE ONE TABLET EVERY MORNING ON AN EMPTY STOMACH 90 Tab 1    simvastatin (ZOCOR) 40 mg tablet TAKE ONE TABLET BY MOUTH AT BEDTIME 90 Tab 1    metoprolol succinate (TOPROL-XL) 50 mg XL tablet Take 1 Tab by mouth daily. 90 Tab 1    hydroCHLOROthiazide (HYDRODIURIL) 25 mg tablet TAKE ONE TABLET BY MOUTH ONE TIME DAILY 90 Tab 1    aspirin-acetaminophen-caffeine (EXCEDRIN MIGRAINE) 250-250-65 mg per tablet Take 2 Tabs by mouth every six (6) hours as needed for Headache.  loratadine (CLARITIN) 10 mg tablet Take 10 mg by mouth.  aspirin delayed-release 81 mg tablet Take 81 mg by mouth daily.  fluticasone (FLONASE ALLERGY RELIEF) 50 mcg/actuation nasal spray 2 Sprays by Both Nostrils route daily.  multivitamin capsule Take 1 Cap by mouth daily. Allergies   Allergen Reactions    Lisinopril Cough    Pcn [Penicillins] Unknown (comments)     Remembers something as a child about PCN     Past Surgical History:   Procedure Laterality Date    HX BACK SURGERY  2003    ? repaired slipped disc    HX TONSILLECTOMY      childhood     Family History   Problem Relation Age of Onset    Diabetes Father     Heart Attack Father     Cancer Brother 48        prostate     Social History     Tobacco Use    Smoking status: Never Smoker    Smokeless tobacco: Never Used   Substance Use Topics    Alcohol use: Yes     Comment: occasional          Objective:     Visit Vitals  BP (!) 125/96   Pulse 89   Temp 97.9 °F (36.6 °C) (Oral)   Resp 16   Ht 5' 10\" (1.778 m)   Wt 229 lb 9.6 oz (104.1 kg)   SpO2 94%   BMI 32.94 kg/m²     General:  Alert, cooperative, no distress, appears stated age. Head:  Normocephalic, without obvious abnormality, atraumatic. Eyes:  Conjunctivae/corneas clear. PERRL, EOMs intact. Neck: Supple, symmetrical, trachea midline, no carotid bruit and no JVD. Lungs:   Clear to auscultation bilaterally. Heart:  Regular rate and rhythm, S1, S2 normal, no murmur, click, rub or gallop. Abdomen:   Soft, non-tender. Extremities: Extremities normal, atraumatic, no cyanosis or edema. Pulses: 2+ and symmetric all extremities. Neurologic: CNII-XII intact. Normal strength, sensation and reflexes throughout. Assessment/Plan:     i've reprinted previous ordered labs and stress test for him. Diagnoses and all orders for this visit:    1. Depression with anxiety  -     clonazePAM (KLONOPIN) 0.5 mg tablet; Take 1 Tab by mouth nightly as needed (anxiety). Max Daily Amount: 0.5 mg.  -     sertraline (ZOLOFT) 100 mg tablet; Take 1 Tab by mouth daily. 2. Hypogonadism in male  -     TESTOSTERONE, FREE & TOTAL  -     PSA W/ REFLX FREE PSA    3. Weight loss counseling, encounter for  -     topiramate (TOPAMAX) 25 mg tablet; Take 1 Tab by mouth two (2) times daily (with meals). Follow-up Disposition:  Return in about 4 weeks (around 4/4/2019) for testosterone, anixety, weight loss. Feliz Lee MD  3/7/2019

## 2019-03-07 NOTE — PATIENT INSTRUCTIONS
Body Mass Index: Care Instructions  Your Care Instructions    Body mass index (BMI) can help you see if your weight is raising your risk for health problems. It uses a formula to compare how much you weigh with how tall you are. · A BMI lower than 18.5 is considered underweight. · A BMI between 18.5 and 24.9 is considered healthy. · A BMI between 25 and 29.9 is considered overweight. A BMI of 30 or higher is considered obese. If your BMI is in the normal range, it means that you have a lower risk for weight-related health problems. If your BMI is in the overweight or obese range, you may be at increased risk for weight-related health problems, such as high blood pressure, heart disease, stroke, arthritis or joint pain, and diabetes. If your BMI is in the underweight range, you may be at increased risk for health problems such as fatigue, lower protection (immunity) against illness, muscle loss, bone loss, hair loss, and hormone problems. BMI is just one measure of your risk for weight-related health problems. You may be at higher risk for health problems if you are not active, you eat an unhealthy diet, or you drink too much alcohol or use tobacco products. Follow-up care is a key part of your treatment and safety. Be sure to make and go to all appointments, and call your doctor if you are having problems. It's also a good idea to know your test results and keep a list of the medicines you take. How can you care for yourself at home? · Practice healthy eating habits. This includes eating plenty of fruits, vegetables, whole grains, lean protein, and low-fat dairy. · If your doctor recommends it, get more exercise. Walking is a good choice. Bit by bit, increase the amount you walk every day. Try for at least 30 minutes on most days of the week. · Do not smoke. Smoking can increase your risk for health problems. If you need help quitting, talk to your doctor about stop-smoking programs and medicines. These can increase your chances of quitting for good. · Limit alcohol to 2 drinks a day for men and 1 drink a day for women. Too much alcohol can cause health problems. If you have a BMI higher than 25  · Your doctor may do other tests to check your risk for weight-related health problems. This may include measuring the distance around your waist. A waist measurement of more than 40 inches in men or 35 inches in women can increase the risk of weight-related health problems. · Talk with your doctor about steps you can take to stay healthy or improve your health. You may need to make lifestyle changes to lose weight and stay healthy, such as changing your diet and getting regular exercise. If you have a BMI lower than 18.5  · Your doctor may do other tests to check your risk for health problems. · Talk with your doctor about steps you can take to stay healthy or improve your health. You may need to make lifestyle changes to gain or maintain weight and stay healthy, such as getting more healthy foods in your diet and doing exercises to build muscle. Where can you learn more? Go to http://brady-leonel.info/. Enter S176 in the search box to learn more about \"Body Mass Index: Care Instructions. \"  Current as of: October 13, 2016  Content Version: 11.4  © 0682-1984 Healthwise, Incorporated. Care instructions adapted under license by [employer] (which disclaims liability or warranty for this information). If you have questions about a medical condition or this instruction, always ask your healthcare professional. [address] any warranty or liability for your use of this information.

## 2019-03-29 DIAGNOSIS — F41.8 DEPRESSION WITH ANXIETY: ICD-10-CM

## 2019-03-29 DIAGNOSIS — I10 ESSENTIAL HYPERTENSION: ICD-10-CM

## 2019-03-29 RX ORDER — SERTRALINE HYDROCHLORIDE 50 MG/1
TABLET, FILM COATED ORAL
Qty: 30 TAB | Refills: 0 | OUTPATIENT
Start: 2019-03-29

## 2019-03-29 RX ORDER — HYDROCHLOROTHIAZIDE 25 MG/1
TABLET ORAL
Qty: 30 TAB | Refills: 0 | Status: SHIPPED | OUTPATIENT
Start: 2019-03-29 | End: 2019-05-25 | Stop reason: SDUPTHER

## 2019-06-21 ENCOUNTER — OFFICE VISIT (OUTPATIENT)
Dept: FAMILY MEDICINE CLINIC | Age: 64
End: 2019-06-21

## 2019-06-21 VITALS
OXYGEN SATURATION: 96 % | RESPIRATION RATE: 20 BRPM | DIASTOLIC BLOOD PRESSURE: 94 MMHG | BODY MASS INDEX: 33.21 KG/M2 | TEMPERATURE: 98.2 F | HEART RATE: 93 BPM | SYSTOLIC BLOOD PRESSURE: 126 MMHG | WEIGHT: 232 LBS | HEIGHT: 70 IN

## 2019-06-21 DIAGNOSIS — Z79.899 ENCOUNTER FOR LONG-TERM (CURRENT) USE OF MEDICATIONS: ICD-10-CM

## 2019-06-21 DIAGNOSIS — J40 BRONCHITIS: ICD-10-CM

## 2019-06-21 DIAGNOSIS — F41.8 DEPRESSION WITH ANXIETY: Primary | ICD-10-CM

## 2019-06-21 DIAGNOSIS — Z91.89 AT RISK FOR SLEEP APNEA: ICD-10-CM

## 2019-06-21 DIAGNOSIS — E03.9 ACQUIRED HYPOTHYROIDISM: ICD-10-CM

## 2019-06-21 DIAGNOSIS — Z86.73 HISTORY OF CVA (CEREBROVASCULAR ACCIDENT): ICD-10-CM

## 2019-06-21 DIAGNOSIS — E78.2 MIXED HYPERLIPIDEMIA: ICD-10-CM

## 2019-06-21 DIAGNOSIS — R73.03 PREDIABETES: ICD-10-CM

## 2019-06-21 DIAGNOSIS — E29.1 HYPOGONADISM IN MALE: ICD-10-CM

## 2019-06-21 DIAGNOSIS — I10 ESSENTIAL HYPERTENSION: ICD-10-CM

## 2019-06-21 DIAGNOSIS — R41.3 MEMORY DEFICIT: ICD-10-CM

## 2019-06-21 DIAGNOSIS — R35.1 NOCTURIA: ICD-10-CM

## 2019-06-21 RX ORDER — SERTRALINE HYDROCHLORIDE 100 MG/1
100 TABLET, FILM COATED ORAL DAILY
Qty: 90 TAB | Refills: 1 | Status: SHIPPED | OUTPATIENT
Start: 2019-06-21 | End: 2020-02-17

## 2019-06-21 RX ORDER — LEVOTHYROXINE SODIUM 75 UG/1
TABLET ORAL
Qty: 90 TAB | Refills: 1 | Status: SHIPPED | OUTPATIENT
Start: 2019-06-21 | End: 2019-08-09

## 2019-06-21 RX ORDER — AZITHROMYCIN 250 MG/1
TABLET, FILM COATED ORAL
Qty: 6 TAB | Refills: 0 | Status: SHIPPED | OUTPATIENT
Start: 2019-06-21 | End: 2019-06-26

## 2019-06-21 RX ORDER — PREDNISONE 10 MG/1
TABLET ORAL
Qty: 21 TAB | Refills: 0 | Status: SHIPPED | OUTPATIENT
Start: 2019-06-21 | End: 2019-07-19

## 2019-06-21 RX ORDER — METOPROLOL SUCCINATE 50 MG/1
50 TABLET, EXTENDED RELEASE ORAL DAILY
Qty: 90 TAB | Refills: 1 | Status: SHIPPED | OUTPATIENT
Start: 2019-06-21 | End: 2020-07-08 | Stop reason: SDUPTHER

## 2019-06-21 RX ORDER — TAMSULOSIN HYDROCHLORIDE 0.4 MG/1
0.4 CAPSULE ORAL DAILY
Qty: 30 CAP | Refills: 2 | Status: SHIPPED | OUTPATIENT
Start: 2019-06-21 | End: 2019-09-03 | Stop reason: SDUPTHER

## 2019-06-21 RX ORDER — SIMVASTATIN 40 MG/1
TABLET, FILM COATED ORAL
Qty: 90 TAB | Refills: 1 | Status: SHIPPED | OUTPATIENT
Start: 2019-06-21 | End: 2020-05-13

## 2019-06-21 NOTE — PROGRESS NOTES
Chief Complaint   Patient presents with    Anxiety     3 mo check    1. Have you been to the ER, urgent care clinic since your last visit? Hospitalized since your last visit? no    2. Have you seen or consulted any other health care providers outside of the [address] [place] [address] since your last visit? Include any pap smears or colon screening.  no

## 2019-06-21 NOTE — PROGRESS NOTES
Subjective:   Donnie Aceves is a 70 y.o. male, present with his wife. 4th visit with this physician.      has a past medical history of Acquired hypothyroidism (6/11/2018), Depression with anxiety (2/15/2018), Essential hypertension (2/15/2018), arterial ischemic stroke (2/15/2018), Mixed hyperlipidemia (2/15/2018), Other ill-defined conditions(799.89) (2010), Prediabetes (10/16/2018), and Stroke ([facility] [address] Did not do his FIT test.     HTN: BP better today, continue course     Bronchitis. Depression anxiety: He report being stable, but says buspar \"made me feels weird\". His wife discussed with me in private about his short temper, and stress. Denies SI or HI  zoloft 50mg. We'll up zoloft to 100mg   Write for clonazepam 0.5mg #15. Discussed risk for addiction, etc, plans for short term use. Low testosterone. NOrmal TSH and Vit D  We'll get 2nd confirm low T.      Weight loss, can't control appetite, topamax 25mg he said takes it once feels weird not taken it again. HLD: report labs done about 3 months ago.      nocturia. Was put on abx for prostatitis and was f/u with PSA, this was a few years ago. PSA didn't check     He has a hx of CVA. She's unsure if it's his hearing or that he forgets. This has been > 1 year, slow progressive. He has a \"very nice hearing aid\", but he doesn't wears it. [place] [facility] [place] someone within [facility]. Wants a referral to neuro. ROS:  Feeling well. No dyspnea or chest pain on exertion. No abdominal pain, change in bowel habits, black or bloody stools. No urinary tract or prostatic symptoms. No neurological complaints.       Patient Active Problem List    Diagnosis Date Noted    Prediabetes 10/16/2018    Acquired hypothyroidism 06/11/2018    Depression with anxiety 02/15/2018    Mixed hyperlipidemia 02/15/2018    Essential hypertension 02/15/2018    Hx of arterial ischemic stroke 02/15/2018     Current Outpatient Medications   Medication Sig Dispense Refill    sertraline (ZOLOFT) 100 mg tablet Take 1 Tab by mouth daily. 90 Tab 1    levothyroxine (SYNTHROID) 75 mcg tablet TAKE ONE TABLET EVERY MORNING ON AN EMPTY STOMACH 90 Tab 1    simvastatin (ZOCOR) 40 mg tablet TAKE ONE TABLET BY MOUTH AT BEDTIME 90 Tab 1    metoprolol succinate (TOPROL-XL) 50 mg XL tablet Take 1 Tab by mouth daily. 90 Tab 1    tamsulosin (FLOMAX) 0.4 mg capsule Take 1 Cap by mouth daily. 30 Cap 2    predniSONE (STERAPRED DS) 10 mg dose pack See administration instruction per 10mg dose pack 21 Tab 0    azithromycin (ZITHROMAX) 250 mg tablet Take 2 tablets today, then take 1 tablet daily 6 Tab 0    hydroCHLOROthiazide (HYDRODIURIL) 25 mg tablet TAKE ONE TABLET BY MOUTH ONE TIME DAILY 90 Tab 0    aspirin-acetaminophen-caffeine (EXCEDRIN MIGRAINE) 250-250-65 mg per tablet Take 2 Tabs by mouth every six (6) hours as needed for Headache.  loratadine (CLARITIN) 10 mg tablet Take 10 mg by mouth.  fluticasone (FLONASE ALLERGY RELIEF) 50 mcg/actuation nasal spray 2 Sprays by Both Nostrils route daily.  multivitamin capsule Take 1 Cap by mouth daily.  aspirin delayed-release 81 mg tablet Take 81 mg by mouth daily. Allergies   Allergen Reactions    Lisinopril Cough    Pcn [Penicillins] Unknown (comments)     Remembers something as a child about PCN     Past Surgical History:   Procedure Laterality Date    HX BACK SURGERY  2003    ? repaired slipped disc    HX TONSILLECTOMY      childhood     Family History   Problem Relation Age of Onset    Diabetes Father     Heart Attack Father     Cancer Brother 48        prostate     Social History     Tobacco Use    Smoking status: Never Smoker    Smokeless tobacco: Never Used   Substance Use Topics    Alcohol use: Yes     Comment: occasional          Objective:     Visit Vitals  BP (!) 126/94   Pulse 93   Temp 98.2 °F (36.8 °C) (Oral)   Resp 20   Ht 5' 10\" (1.778 m)   Wt 232 lb (105.2 kg)   SpO2 96%   BMI 33.29 kg/m²     General:  Alert, cooperative, no distress, appears stated age. Head:  Normocephalic, without obvious abnormality, atraumatic. Eyes:  Conjunctivae/corneas clear. PERRL, EOMs intact. Neck: Supple, symmetrical, trachea midline, no carotid bruit and no JVD. Lungs:   Clear to auscultation bilaterally. Heart:  Regular rate and rhythm, S1, S2 normal, no murmur, click, rub or gallop. Abdomen:   Soft, non-tender. Extremities: Extremities normal, atraumatic, no cyanosis or edema. Pulses: 2+ and symmetric all extremities. Neurologic: CNII-XII intact. Normal strength, sensation and reflexes throughout. Assessment/Plan:     i've reprinted previous ordered labs and stress test for him. Diagnoses and all orders for this visit:    1. Depression with anxiety  -     sertraline (ZOLOFT) 100 mg tablet; Take 1 Tab by mouth daily.  -     METABOLIC PANEL, COMPREHENSIVE  -     TSH 3RD GENERATION    2. Mixed hyperlipidemia  -     simvastatin (ZOCOR) 40 mg tablet; TAKE ONE TABLET BY MOUTH AT BEDTIME  -     TESTOSTERONE, FREE & TOTAL  -     LIPID PANEL    3. Essential hypertension  -     metoprolol succinate (TOPROL-XL) 50 mg XL tablet; Take 1 Tab by mouth daily.  -     METABOLIC PANEL, COMPREHENSIVE    4. Hypogonadism in male  -     TESTOSTERONE, FREE & TOTAL  -     PSA W/ REFLX FREE PSA    5. Acquired hypothyroidism  -     levothyroxine (SYNTHROID) 75 mcg tablet; TAKE ONE TABLET EVERY MORNING ON AN EMPTY STOMACH  -     TSH 3RD GENERATION    6. Prediabetes  -     HEMOGLOBIN A1C W/O EAG    7. Memory deficit  -     REFERRAL TO NEUROLOGY    8. History of CVA (cerebrovascular accident)  -     REFERRAL TO NEUROLOGY    9. Encounter for long-term (current) use of medications  -     TESTOSTERONE, FREE & TOTAL  -     METABOLIC PANEL, COMPREHENSIVE  -     LIPID PANEL  -     TSH 3RD GENERATION  -     PSA W/ REFLX FREE PSA  -     HEMOGLOBIN A1C W/O EAG    10.  At risk for sleep apnea  -     REFERRAL TO SLEEP STUDIES    11. Nocturia  -     tamsulosin (FLOMAX) 0.4 mg capsule; Take 1 Cap by mouth daily. 12. Bronchitis  -     predniSONE (STERAPRED DS) 10 mg dose pack; See administration instruction per 10mg dose pack  -     azithromycin (ZITHROMAX) 250 mg tablet; Take 2 tablets today, then take 1 tablet daily      Follow-up and Dispositions    · Return in about 1 month (around 7/19/2019) for testosterone and labs.            Latasha Fischer MD  6/21/2019

## 2019-06-21 NOTE — PATIENT INSTRUCTIONS
Hypogonadism: Care Instructions Your Care Instructions Men who have hypogonadism do not make enough testosterone. This hormone allows men to make sperm and to have normal physical male traits. The condition also is known as testosterone deficiency. It can lead to loss of sex drive, weakness, impotence, infertility, and weakened bones. Many things can cause this condition. Causes include injured testicles, certain medicines, an infection, and aging. Having a long-term health problem such as kidney or liver disease or being obese can cause it. So can surgery or radiation treatment for another health problem. It also can be present at birth. It is most often treated with testosterone hormone. You can get the hormone as a shot or through a patch or gel on the skin. Follow-up care is a key part of your treatment and safety. Be sure to make and go to all appointments, and call your doctor if you are having problems. It's also a good idea to know your test results and keep a list of the medicines you take. How can you care for yourself at home? · Take your medicines exactly as prescribed. Call your doctor if you think you are having a problem with your medicine. You will get more details on the specific medicines your doctor prescribes. · Follow your treatment plan. If you use testosterone hormones, follow your doctor's instructions. Hormones can help relieve many of the effects of this condition, such as impotence. But it may take weeks or months for your symptoms to improve. · Get plenty of exercise. And make sure to get plenty of calcium and vitamin D in your diet. Eat more dairy foods and green vegetables. They can help keep your bones from getting weak. · If you have a hard time dealing with this condition, talk to your doctor about joining a support group. Talking with others who have the same problems can help you cope. When should you call for help? Call your doctor now or seek immediate medical care if: 
  · You have headaches.  
  · You have problems with your vision.  
 Watch closely for changes in your health, and be sure to contact your doctor if: 
  · You have trouble getting or keeping an erection.  
  · You have a loss of body hair.  
  · You feel weak or tired a lot of the time.  
  · Your breasts are getting larger.  
  · You do not get better as expected. Where can you learn more? Go to http://brady-leonel.info/. Enter 99 [id number] in the search box to learn more about \"Hypogonadism: Care Instructions. \" Current as of: March 14, 2018 Content Version: 11.9 © 4106-5405 [employer]. Care instructions adapted under license by [employer] (which disclaims liability or warranty for this information). If you have questions about a medical condition or this instruction, always ask your healthcare professional. [address] any warranty or liability for your use of this information.

## 2019-06-25 ENCOUNTER — TELEPHONE (OUTPATIENT)
Dept: SLEEP MEDICINE | Age: 64
End: 2019-06-25

## 2019-06-25 NOTE — TELEPHONE ENCOUNTER
LVM requesting a return call to schedule a sleep consultation per Dr. Guadalupe Baxter for sleep consult.

## 2019-07-19 ENCOUNTER — OFFICE VISIT (OUTPATIENT)
Dept: URGENT CARE | Age: 64
End: 2019-07-19

## 2019-07-19 VITALS
TEMPERATURE: 97.2 F | BODY MASS INDEX: 33.21 KG/M2 | HEART RATE: 74 BPM | RESPIRATION RATE: 18 BRPM | OXYGEN SATURATION: 96 % | DIASTOLIC BLOOD PRESSURE: 74 MMHG | HEIGHT: 70 IN | WEIGHT: 232 LBS | SYSTOLIC BLOOD PRESSURE: 148 MMHG

## 2019-07-19 DIAGNOSIS — S83.91XA SPRAIN OF RIGHT KNEE, UNSPECIFIED LIGAMENT, INITIAL ENCOUNTER: Primary | ICD-10-CM

## 2019-07-19 NOTE — PROGRESS NOTES
Here for right knee pain  Onset 4 days ago  Stepped down from single stair step and felt sharp pain. Did not fall to ground. At time was 7/10. Today in office is 1/10. Non radiating. Since incident has progressively improved. Denies locking popping, clicking or giving out. No weakness, numbness or tingling. No hx of prior knee injury. Past Medical History:   Diagnosis Date    Acquired hypothyroidism 6/11/2018    Depression with anxiety 2/15/2018    Essential hypertension 2/15/2018    Hx of arterial ischemic stroke 2/15/2018    Mixed hyperlipidemia 2/15/2018    Other ill-defined conditions(799.89) 2010    elevated PSA 6 months ago    Prediabetes 10/16/2018    Stroke [facility])         Past Surgical History:   Procedure Laterality Date    HX BACK SURGERY  2003    ? repaired slipped disc    HX TONSILLECTOMY      childhood         Family History   Problem Relation Age of Onset    Diabetes Father     Heart Attack Father     Cancer Brother 48        prostate        Social History     Socioeconomic History    Marital status:      Spouse name: Not on file    Number of children: Not on file    Years of education: Not on file    Highest education level: Not on file   Occupational History    Not on file   Social Needs    Financial resource strain: Not on file    Food insecurity:     Worry: Not on file     Inability: Not on file    Transportation needs:     Medical: Not on file     Non-medical: Not on file   Tobacco Use    Smoking status: Never Smoker    Smokeless tobacco: Never Used   Substance and Sexual Activity    Alcohol use: Yes     Comment: occasional    Drug use: Yes     Types: Prescription, OTC, Marijuana    Sexual activity: Not on file   Lifestyle    Physical activity:     Days per week: Not on file     Minutes per session: Not on file    Stress: Not on file   Relationships    Social connections:     Talks on phone: Not on file     Gets together: Not on file Attends Congregational service: Not on file     Active member of club or organization: Not on file     Attends meetings of clubs or organizations: Not on file     Relationship status: Not on file    Intimate partner violence:     Fear of current or ex partner: Not on file     Emotionally abused: Not on file     Physically abused: Not on file     Forced sexual activity: Not on file   Other Topics Concern    Not on file   Social History Narrative    Not on file                ALLERGIES: Lisinopril and Pcn [penicillins]    Review of Systems   All other systems reviewed and are negative. Vitals:    07/19/19 1452   BP: 148/74   Pulse: 74   Resp: 18   Temp: 97.2 °F (36.2 °C)   SpO2: 96%   Weight: 232 lb (105.2 kg)   Height: 5' 10\" (1.778 m)       Physical Exam   Constitutional: He is oriented to person, place, and time. He appears well-developed and well-nourished. No distress. HENT:   Head: Normocephalic and atraumatic. Eyes: EOM are normal.   Cardiovascular: Normal rate, regular rhythm and normal heart sounds. Pulmonary/Chest: Effort normal and breath sounds normal.   Musculoskeletal:        Right knee: He exhibits normal range of motion, no swelling, no effusion, no ecchymosis, no deformity, no laceration, no erythema, normal alignment, no LCL laxity, normal patellar mobility, no bony tenderness, normal meniscus and no MCL laxity. Tenderness found. Lateral joint line and LCL tenderness noted. No medial joint line, no MCL and no patellar tendon tenderness noted. Negative anterior/posterior drawer test.  Mild lateral knee pain TTP. No deformity. Full 5/5 strength flexion and extension at knee. Neurological: He is alert and oriented to person, place, and time. Skin: Skin is warm and dry. Psychiatric: He has a normal mood and affect.  His behavior is normal. Thought content normal.       MDM     Differential Diagnosis; Clinical Impression; Plan:       CLINICAL IMPRESSION:  (S83.91XA) Sprain of right knee, unspecified ligament, initial encounter  (primary encounter diagnosis)    Plan:  No indication for x ray  Self reports improving  May use naproxen, cool compresses  Advised ortho for new, worsening or changes to eval for ligamentous injury. Should also see ortho if not improved over next 1 week. Review handouts     We have reviewed concerning signs/symptoms, normal vs abnormal progression of medical condition and when to seek immediate medical attention. Schedule with PCP or Urgent Care immediately for worsening or new symptoms. You should see your PCP for updates on your routine health maintenance.              Procedures

## 2019-07-19 NOTE — LETTER
NOTIFICATION RETURN TO WORK / SCHOOL 
 
7/19/2019 3:28 PM 
 
Mr. Evans [facility] [address] [id number] To Whom It May Concern: 
 
Geoff Rendon is currently under the care of [address]. He will return to work/school on: 07/22/2019 If there are questions or concerns please have the patient contact our office. Sincerely, E PROVIDER

## 2019-07-19 NOTE — PATIENT INSTRUCTIONS
Locking, giving out or any new, worsening or changes, you should follow up with orthopedics to evaluate for ligament injury. Otherwise over the counter aleve and or tylenol as directed  Cool compresses and activities as tolerated. See ortho if not continuing to improve over next 7-10 days. Knee Sprain: Care Instructions  Your Care Instructions    A knee sprain is one or more stretched, partly torn, or completely torn knee ligaments. Ligaments are bands of ropelike tissue that connect bone to bone and make the knee stable. The knee has four main ligaments. Knee sprains often happen because of a twisting or bending injury from sports such as skiing, basketball, soccer, or football. The knee turns one way while the lower or upper leg goes another way. A sprain also can happen when the knee is hit from the side or the front. If a knee ligament is slightly stretched, you will probably need only home treatment. You may need a splint or brace (immobilizer) for a partly torn ligament. A complete tear may need surgery. A minor knee sprain may take up to 6 weeks to heal, while a severe sprain may take months. Follow-up care is a key part of your treatment and safety. Be sure to make and go to all appointments, and call your doctor if you are having problems. It's also a good idea to know your test results and keep a list of the medicines you take. How can you care for yourself at home? · Follow instructions about how much weight you can put on your leg and how to walk with crutches. · Prop up your leg on a pillow when you ice it or anytime you sit or lie down for the next 3 days. Try to keep it above the level of your heart. This will help reduce swelling. · Put ice or a cold pack on your knee for 10 to 20 minutes at a time. Try to do this every 1 to 2 hours for the next 3 days (when you are awake) or until the swelling goes down. Put a thin cloth between the ice and your skin.  Do not get the splint wet.  · If you have an elastic bandage, make sure it is snug but not so tight that your leg is numb, tingles, or swells below the bandage. You can loosen the bandage if it is too tight. · Your doctor may recommend a brace (immobilizer) to support your knee while it heals. Wear it as directed. · Ask your doctor if you can take an over-the-counter pain medicine, such as acetaminophen (Tylenol), ibuprofen (Advil, Motrin), or naproxen (Aleve). Be safe with medicines. Read and follow all instructions on the label. When should you call for help? Call 911 anytime you think you may need emergency care. For example, call if:    · You have sudden chest pain and shortness of breath, or you cough up blood.    Call your doctor now or seek immediate medical care if:    · You have increased or severe pain.     · You cannot move your toes or ankle.     · Your foot is cool or pale or changes color.     · You have tingling, weakness, or numbness in your foot or leg.     · Your splint or brace feels too tight.     · You are unable to straighten the knee, or the knee \"locks. \"     · You have signs of a blood clot in your leg, such as:  ? Pain in your calf, back of the knee, thigh, or groin. ? Redness and swelling in your leg.    Watch closely for changes in your health, and be sure to contact your doctor if:    · Your pain is not getting better or is getting worse. Where can you learn more? Go to http://brady-leonel.info/. Enter N406 in the search box to learn more about \"Knee Sprain: Care Instructions. \"  Current as of: September 20, 2018  Content Version: 11.9  © 1599-4220 [employer]. Care instructions adapted under license by [employer] (which disclaims liability or warranty for this information).  If you have questions about a medical condition or this instruction, always ask your healthcare professional. [address] [address] any warranty or liability for your use of this information.

## 2019-07-25 ENCOUNTER — TELEPHONE (OUTPATIENT)
Dept: FAMILY MEDICINE CLINIC | Age: 64
End: 2019-07-25

## 2019-07-25 NOTE — TELEPHONE ENCOUNTER
----- Message from Powers Kev sent at 7/25/2019 12:54 PM EDT -----  Regarding: Dr. Harpreet Aguilar / Libia Yuen first and last name:  self      Reason for call: schedule labs/ appt reschedule      Callback required yes/no and why:  Yes- need to schedule labs      Best contacts : [phone]      Details to clarify the request: scheduled appt Friday, August 02, 2019 02:20 PM . Patient needs to schedule lab appt and needs a copy of orders to send with him.        Eden Kyle

## 2019-08-04 LAB
ALBUMIN SERPL-MCNC: 4.2 G/DL (ref 3.6–4.8)
ALBUMIN/GLOB SERPL: 1.7 {RATIO} (ref 1.2–2.2)
ALP SERPL-CCNC: 86 IU/L (ref 39–117)
ALT SERPL-CCNC: 25 IU/L (ref 0–44)
AST SERPL-CCNC: 19 IU/L (ref 0–40)
BILIRUB SERPL-MCNC: 0.6 MG/DL (ref 0–1.2)
BUN SERPL-MCNC: 15 MG/DL (ref 8–27)
BUN/CREAT SERPL: 14 (ref 10–24)
CALCIUM SERPL-MCNC: 9.4 MG/DL (ref 8.6–10.2)
CHLORIDE SERPL-SCNC: 99 MMOL/L (ref 96–106)
CHOLEST SERPL-MCNC: 237 MG/DL (ref 100–199)
CO2 SERPL-SCNC: 28 MMOL/L (ref 20–29)
CREAT SERPL-MCNC: 1.06 MG/DL (ref 0.76–1.27)
GLOBULIN SER CALC-MCNC: 2.5 G/DL (ref 1.5–4.5)
GLUCOSE SERPL-MCNC: 108 MG/DL (ref 65–99)
HBA1C MFR BLD: 5.9 % (ref 4.8–5.6)
HDLC SERPL-MCNC: 43 MG/DL
LDLC SERPL CALC-MCNC: 153 MG/DL (ref 0–99)
POTASSIUM SERPL-SCNC: 4.8 MMOL/L (ref 3.5–5.2)
PROT SERPL-MCNC: 6.7 G/DL (ref 6–8.5)
PSA SERPL-MCNC: 3.8 NG/ML (ref 0–4)
REFLEX CRITERIA: NORMAL
SODIUM SERPL-SCNC: 141 MMOL/L (ref 134–144)
TESTOST FREE SERPL-MCNC: 2 PG/ML (ref 6.6–18.1)
TESTOST SERPL-MCNC: 83 NG/DL (ref 264–916)
TRIGL SERPL-MCNC: 206 MG/DL (ref 0–149)
TSH SERPL DL<=0.005 MIU/L-ACNC: 9.06 UIU/ML (ref 0.45–4.5)
VLDLC SERPL CALC-MCNC: 41 MG/DL (ref 5–40)

## 2019-08-09 ENCOUNTER — OFFICE VISIT (OUTPATIENT)
Dept: FAMILY MEDICINE CLINIC | Age: 64
End: 2019-08-09

## 2019-08-09 VITALS
RESPIRATION RATE: 18 BRPM | HEIGHT: 70 IN | HEART RATE: 91 BPM | WEIGHT: 231 LBS | DIASTOLIC BLOOD PRESSURE: 104 MMHG | TEMPERATURE: 98.5 F | SYSTOLIC BLOOD PRESSURE: 135 MMHG | BODY MASS INDEX: 33.07 KG/M2 | OXYGEN SATURATION: 94 %

## 2019-08-09 DIAGNOSIS — E29.1 HYPOGONADISM IN MALE: Primary | ICD-10-CM

## 2019-08-09 DIAGNOSIS — I10 ESSENTIAL HYPERTENSION: ICD-10-CM

## 2019-08-09 DIAGNOSIS — R73.03 PREDIABETES: ICD-10-CM

## 2019-08-09 DIAGNOSIS — E78.2 MIXED HYPERLIPIDEMIA: ICD-10-CM

## 2019-08-09 DIAGNOSIS — E03.9 ACQUIRED HYPOTHYROIDISM: ICD-10-CM

## 2019-08-09 RX ORDER — TESTOSTERONE CYPIONATE 200 MG/ML
200 INJECTION INTRAMUSCULAR ONCE
Qty: 1 ML | Refills: 0
Start: 2019-08-09 | End: 2019-08-09

## 2019-08-09 RX ORDER — LEVOTHYROXINE SODIUM 88 UG/1
TABLET ORAL
Qty: 30 TAB | Refills: 1 | Status: SHIPPED | OUTPATIENT
Start: 2019-08-09 | End: 2019-11-12 | Stop reason: SDUPTHER

## 2019-08-09 NOTE — PROGRESS NOTES
Subjective:   Meet Denise is a 75 y.o. male, present with his wife. 4th visit with this physician.      has a past medical history of Acquired hypothyroidism (6/11/2018), Depression with anxiety (2/15/2018), Essential hypertension (2/15/2018), arterial ischemic stroke (2/15/2018), Mixed hyperlipidemia (2/15/2018), Other ill-defined conditions(799.89) (2010), Prediabetes (10/16/2018), and Stroke ([facility] Did not do his FIT test.     Hypogonadism: symptoms include, chronic fatigue, depressed mood, decreased libido, ED, lack of energy. Discussed risk of blood clots, cholesterol and PSA. He wants to proceed. Start testosterone 200mg. Hypothyroidism: up levothyroxine to 88mcg    HTN: BP better today, continue course     Bronchitis. Depression anxiety: He report being stable, but says buspar \"made me feels weird\". His wife discussed with me in private about his short temper, and stress. Denies SI or HI  zoloft to 100mg   Write for clonazepam 0.5mg #15. Discussed risk for addiction, etc, plans for short term use. Prediabetes a1C 5.9%    Weight loss, can't control appetite, topamax 25mg he said takes it once feels weird not taken it again. HLD: report labs done about 3 months ago.      nocturia. Was put on abx for prostatitis and was f/u with PSA, this was a few years ago. PSA didn't check     He has a hx of CVA. She's unsure if it's his hearing or that he forgets. This has been > 1 year, slow progressive. He has a \"very nice hearing aid\", but he doesn't wears it. He have a neurologist at [facility]. But wanted to see someone within [employer] system. Not taking aspirin. Will need to restart    He's been referred for sleep study but never f/u. ROS:  Feeling well. No dyspnea or chest pain on exertion. No abdominal pain, change in bowel habits, black or bloody stools. No urinary tract or prostatic symptoms. No neurological complaints.       Patient Active Problem List    Diagnosis Date Noted    Prediabetes 10/16/2018    Acquired hypothyroidism 06/11/2018    Depression with anxiety 02/15/2018    Mixed hyperlipidemia 02/15/2018    Essential hypertension 02/15/2018    Hx of arterial ischemic stroke 02/15/2018     Current Outpatient Medications   Medication Sig Dispense Refill    levothyroxine (SYNTHROID) 88 mcg tablet TAKE ONE TABLET EVERY MORNING ON AN EMPTY STOMACH 30 Tab 1    sertraline (ZOLOFT) 100 mg tablet Take 1 Tab by mouth daily. 90 Tab 1    simvastatin (ZOCOR) 40 mg tablet TAKE ONE TABLET BY MOUTH AT BEDTIME 90 Tab 1    metoprolol succinate (TOPROL-XL) 50 mg XL tablet Take 1 Tab by mouth daily. 90 Tab 1    tamsulosin (FLOMAX) 0.4 mg capsule Take 1 Cap by mouth daily. 30 Cap 2    hydroCHLOROthiazide (HYDRODIURIL) 25 mg tablet TAKE ONE TABLET BY MOUTH ONE TIME DAILY 90 Tab 0    aspirin-acetaminophen-caffeine (EXCEDRIN MIGRAINE) 250-250-65 mg per tablet Take 2 Tabs by mouth every six (6) hours as needed for Headache.  loratadine (CLARITIN) 10 mg tablet Take 10 mg by mouth.  fluticasone (FLONASE ALLERGY RELIEF) 50 mcg/actuation nasal spray 2 Sprays by Both Nostrils route daily.  multivitamin capsule Take 1 Cap by mouth daily.  aspirin delayed-release 81 mg tablet Take 81 mg by mouth daily. Allergies   Allergen Reactions    Lisinopril Cough    Pcn [Penicillins] Unknown (comments)     Remembers something as a child about PCN     Past Surgical History:   Procedure Laterality Date    HX BACK SURGERY  2003    ? repaired slipped disc    HX TONSILLECTOMY      childhood     Family History   Problem Relation Age of Onset    Diabetes Father     Heart Attack Father     Cancer Brother 48        prostate     Social History     Tobacco Use    Smoking status: Never Smoker    Smokeless tobacco: Never Used   Substance Use Topics    Alcohol use: Yes     Comment: occasional        Objective:     Visit Vitals  BP (!) 135/104   Pulse 91   Temp 98.5 °F (36.9 °C) (Oral)   Resp 18   Ht 5' 10\" (1.778 m)   Wt 231 lb (104.8 kg)   SpO2 94%   BMI 33.15 kg/m²     General:  Alert, cooperative, no distress, appears stated age. Head:  Normocephalic, without obvious abnormality, atraumatic. Eyes:  Conjunctivae/corneas clear. PERRL, EOMs intact. Neck: Supple, symmetrical, trachea midline, no carotid bruit and no JVD. Lungs:   Clear to auscultation bilaterally. Heart:  Regular rate and rhythm, S1, S2 normal, no murmur, click, rub or gallop. Abdomen:   Soft, non-tender. Extremities: Extremities normal, atraumatic, no cyanosis or edema. Pulses: 2+ and symmetric all extremities. Neurologic: CNII-XII intact. Normal strength, sensation and reflexes throughout. Assessment/Plan:     Diagnoses and all orders for this visit:    1. Hypogonadism in male  -     testosterone cypionate (DEPOTESTOTERONE CYPIONATE) 200 mg/mL injection; 1 mL by IntraMUSCular route once for 1 dose. Max Daily Amount: 200 mg.  -     MS INJ TESTOSTERONE CYPIONATE  -     MS THER/PROPH/DIAG INJECTION, SUBCUT/IM    2. Acquired hypothyroidism  -     levothyroxine (SYNTHROID) 88 mcg tablet; TAKE ONE TABLET EVERY MORNING ON AN EMPTY STOMACH    3. Essential hypertension    4. Mixed hyperlipidemia    5. Prediabetes      Follow-up and Dispositions    · Return in about 3 weeks (around 8/30/2019) for testosterone inj.          Noe Bo MD  8/12/2019

## 2019-08-09 NOTE — PROGRESS NOTES
Chief Complaint   Patient presents with    Results       1. Have you been to the ER, urgent care clinic since your last visit? Hospitalized since your last visit? yes    2. Have you seen or consulted any other health care providers outside of the [address] [place] [address] since your last visit? Include any pap smears or colon screening. yes    Testosterone 200 mg, IM given, per Order from Dr. Bakari Smart on 8/9/2019 due to low testosterone. Jorge Hdz is a 75 y.o. male who presents for injection. He denies any symptoms , reactions or allergies that would exclude them from being injected today. Risks and adverse reactions were discussed. All questions were addressed. He was observed for 15 min post injection. There were no reactions observed.     Goldie Guardado LPN

## 2019-08-09 NOTE — LETTER
8/9/2019 3:27 PM 
 
[address]. [id number] Dear Lisa Jacobson: Please find your most recent results below. Resulted Orders TESTOSTERONE, FREE & TOTAL (Collected: 8/2/2019  9:59 AM) Result Value Ref Range Testosterone 83 (L) 264 - 916 ng/dL Comment:  
   Adult male reference interval is based on a population of 
healthy nonobese males (BMI <30) between 23and 44years old. [address] [place] [address], [address] [place] [address] [id number]. PMID: 96306058. Free testosterone (Direct) 2.0 (L) 6.6 - 18.1 pg/mL Narrative Performed at:  [address]  [id number] : Lazara Crandall MD, Phone:  [phone] METABOLIC PANEL, COMPREHENSIVE (Collected: 8/2/2019  9:59 AM) Result Value Ref Range Glucose 108 (H) 65 - 99 mg/dL BUN 15 8 - 27 mg/dL Creatinine 1.06 0.76 - 1.27 mg/dL GFR est non-AA 73 >59 mL/min/1.73 GFR est AA 84 >59 mL/min/1.73  
 BUN/Creatinine ratio 14 10 - 24 Sodium 141 134 - 144 mmol/L Potassium 4.8 3.5 - 5.2 mmol/L Chloride 99 96 - 106 mmol/L  
 CO2 28 20 - 29 mmol/L Calcium 9.4 8.6 - 10.2 mg/dL Protein, total 6.7 6.0 - 8.5 g/dL Albumin 4.2 3.6 - 4.8 g/dL GLOBULIN, TOTAL 2.5 1.5 - 4.5 g/dL A-G Ratio 1.7 1.2 - 2.2 Bilirubin, total 0.6 0.0 - 1.2 mg/dL Alk. phosphatase 86 39 - 117 IU/L  
 AST (SGOT) 19 0 - 40 IU/L  
 ALT (SGPT) 25 0 - 44 IU/L Narrative Performed at:  [address]  [id number] : Lazara Crandall MD, Phone:  [phone] LIPID PANEL (Collected: 8/2/2019  9:59 AM) Result Value Ref Range Cholesterol, total 237 (H) 100 - 199 mg/dL Triglyceride 206 (H) 0 - 149 mg/dL HDL Cholesterol 43 >39 mg/dL VLDL, calculated 41 (H) 5 - 40 mg/dL LDL, calculated 153 (H) 0 - 99 mg/dL Narrative Performed at:  [address]  [id number] : Itz Rayo MD, Phone:  [phone] TSH 3RD GENERATION (Collected: 8/2/2019  9:59 AM) Result Value Ref Range TSH 9.060 (H) 0.450 - 4.500 uIU/mL Narrative Performed at:  [address]  [id number] : Itz Rayo MD, Phone:  [phone] PSA W/ REFLX FREE PSA (Collected: 8/2/2019  9:59 AM) Result Value Ref Range Prostate Specific Ag 3.8 0.0 - 4.0 ng/mL Comment:  
   Roche ECLIA methodology. According to the American Urological Association, Serum PSA should 
decrease and remain at undetectable levels after radical 
prostatectomy. The AUA defines biochemical recurrence as an initial 
PSA value 0.2 ng/mL or greater followed by a subsequent confirmatory PSA value 0.2 ng/mL or greater. Values obtained with different assay methods or kits cannot be used 
interchangeably. Results cannot be interpreted as absolute evidence 
of the presence or absence of malignant disease. HEMOGLOBIN A1C W/O EAG (Collected: 8/2/2019  9:59 AM) Result Value Ref Range Hemoglobin A1c 5.9 (H) 4.8 - 5.6 % Comment:  
            Prediabetes: 5.7 - 6.4 Diabetes: >6.4 Glycemic control for adults with diabetes: <7.0 Narrative Performed at:  [address]  [id number] : Itz Rayo MD, Phone:  [phone] Sincerely, Huy Simpson MD

## 2019-09-03 ENCOUNTER — OFFICE VISIT (OUTPATIENT)
Dept: FAMILY MEDICINE CLINIC | Age: 64
End: 2019-09-03

## 2019-09-03 VITALS
TEMPERATURE: 97.6 F | HEART RATE: 85 BPM | RESPIRATION RATE: 18 BRPM | SYSTOLIC BLOOD PRESSURE: 122 MMHG | HEIGHT: 70 IN | DIASTOLIC BLOOD PRESSURE: 91 MMHG | OXYGEN SATURATION: 95 % | BODY MASS INDEX: 33.33 KG/M2 | WEIGHT: 232.8 LBS

## 2019-09-03 DIAGNOSIS — R53.82 CHRONIC FATIGUE: ICD-10-CM

## 2019-09-03 DIAGNOSIS — E03.9 ACQUIRED HYPOTHYROIDISM: ICD-10-CM

## 2019-09-03 DIAGNOSIS — Z79.899 LONG-TERM USE OF HIGH-RISK MEDICATION: ICD-10-CM

## 2019-09-03 DIAGNOSIS — E29.1 HYPOGONADISM IN MALE: Primary | ICD-10-CM

## 2019-09-03 RX ORDER — TOPIRAMATE 25 MG/1
TABLET ORAL
COMMUNITY
Start: 2019-09-03 | End: 2020-02-17

## 2019-09-03 RX ORDER — TESTOSTERONE CYPIONATE 200 MG/ML
200 INJECTION INTRAMUSCULAR ONCE
Qty: 1 VIAL | Refills: 0
Start: 2019-09-03 | End: 2019-09-03

## 2019-09-03 RX ORDER — TESTOSTERONE CYPIONATE 200 MG/ML
100 INJECTION INTRAMUSCULAR ONCE
Qty: 1 VIAL | Refills: 0
Start: 2019-09-03 | End: 2019-09-03

## 2019-09-03 NOTE — PROGRESS NOTES
Chief Complaint   Patient presents with    Injection     testosterone       1. Have you been to the ER, urgent care clinic since your last visit? Hospitalized since your last visit? no    2. Have you seen or consulted any other health care providers outside of the [address] [place] [address] since your last visit? Include any pap smears or colon screening. no      Testosterone 300 mg, IM given, per Order from Dr. Narendra Wynne on 9/3/2019 due to low testosterone. Patient observed for 15 min, no reaction.

## 2019-10-02 ENCOUNTER — CLINICAL SUPPORT (OUTPATIENT)
Dept: FAMILY MEDICINE CLINIC | Age: 64
End: 2019-10-02

## 2019-10-02 VITALS
DIASTOLIC BLOOD PRESSURE: 87 MMHG | HEART RATE: 92 BPM | OXYGEN SATURATION: 95 % | BODY MASS INDEX: 32.58 KG/M2 | SYSTOLIC BLOOD PRESSURE: 130 MMHG | RESPIRATION RATE: 18 BRPM | HEIGHT: 70 IN | TEMPERATURE: 98.4 F | WEIGHT: 227.6 LBS

## 2019-10-02 DIAGNOSIS — E29.1 HYPOGONADISM IN MALE: Primary | ICD-10-CM

## 2019-10-02 RX ORDER — TESTOSTERONE CYPIONATE 200 MG/ML
200 INJECTION INTRAMUSCULAR ONCE
Qty: 1 VIAL | Refills: 0
Start: 2019-10-02 | End: 2019-10-02

## 2019-10-02 NOTE — PROGRESS NOTES
Chief Complaint   Patient presents with    Injection     testosterone     Testosterone 200 mg, IM given. Observed for 15 min. . No reaction.

## 2019-10-22 ENCOUNTER — CLINICAL SUPPORT (OUTPATIENT)
Dept: FAMILY MEDICINE CLINIC | Age: 64
End: 2019-10-22

## 2019-10-22 VITALS
HEIGHT: 70 IN | DIASTOLIC BLOOD PRESSURE: 91 MMHG | TEMPERATURE: 98.1 F | OXYGEN SATURATION: 96 % | WEIGHT: 231 LBS | SYSTOLIC BLOOD PRESSURE: 114 MMHG | HEART RATE: 85 BPM | RESPIRATION RATE: 18 BRPM | BODY MASS INDEX: 33.07 KG/M2

## 2019-10-22 DIAGNOSIS — E29.1 HYPOGONADISM IN MALE: Primary | ICD-10-CM

## 2019-10-22 RX ORDER — TESTOSTERONE CYPIONATE 200 MG/ML
200 INJECTION INTRAMUSCULAR ONCE
Qty: 1 ML | Refills: 0
Start: 2019-10-22 | End: 2019-10-22

## 2019-10-22 NOTE — PROGRESS NOTES
Chief Complaint   Patient presents with    Injection     testosterone       Testosterone 200 mg, IM given per order from Dr. Arley Sanford d/t low testosterone. Observed for 15 min, no reaction.

## 2019-11-13 ENCOUNTER — OFFICE VISIT (OUTPATIENT)
Dept: FAMILY MEDICINE CLINIC | Age: 64
End: 2019-11-13

## 2019-11-13 VITALS
HEART RATE: 80 BPM | OXYGEN SATURATION: 96 % | TEMPERATURE: 98.7 F | RESPIRATION RATE: 18 BRPM | BODY MASS INDEX: 34.07 KG/M2 | SYSTOLIC BLOOD PRESSURE: 118 MMHG | DIASTOLIC BLOOD PRESSURE: 77 MMHG | WEIGHT: 238 LBS | HEIGHT: 70 IN

## 2019-11-13 DIAGNOSIS — R73.03 PREDIABETES: ICD-10-CM

## 2019-11-13 DIAGNOSIS — Z79.899 LONG-TERM USE OF HIGH-RISK MEDICATION: ICD-10-CM

## 2019-11-13 DIAGNOSIS — Z23 ENCOUNTER FOR IMMUNIZATION: ICD-10-CM

## 2019-11-13 DIAGNOSIS — Z91.199 NONCOMPLIANCE: ICD-10-CM

## 2019-11-13 DIAGNOSIS — G47.00 INSOMNIA, UNSPECIFIED TYPE: ICD-10-CM

## 2019-11-13 DIAGNOSIS — E29.1 HYPOGONADISM IN MALE: ICD-10-CM

## 2019-11-13 DIAGNOSIS — E03.9 ACQUIRED HYPOTHYROIDISM: ICD-10-CM

## 2019-11-13 DIAGNOSIS — I10 ESSENTIAL HYPERTENSION: Primary | ICD-10-CM

## 2019-11-13 DIAGNOSIS — J42 CHRONIC BRONCHITIS, UNSPECIFIED CHRONIC BRONCHITIS TYPE (HCC): ICD-10-CM

## 2019-11-13 DIAGNOSIS — E78.2 MIXED HYPERLIPIDEMIA: ICD-10-CM

## 2019-11-13 RX ORDER — TRAZODONE HYDROCHLORIDE 50 MG/1
50 TABLET ORAL
Qty: 30 TAB | Refills: 1 | Status: SHIPPED | OUTPATIENT
Start: 2019-11-13 | End: 2020-07-08 | Stop reason: SDUPTHER

## 2019-11-13 NOTE — PATIENT INSTRUCTIONS
Vaccine Information Statement Influenza (Flu) Vaccine (Inactivated or Recombinant): What You Need to Know Many Vaccine Information Statements are available in [place] and other languages. See www.immunize.org/vis Hojas de información sobre vacunas están disponibles en español y en muchos otros idiomas. Visite www.immunize.org/vis 1. Why get vaccinated? Influenza vaccine can prevent influenza (flu). Flu is a contagious disease that spreads around the [place] every year, usually between October and May. Anyone can get the flu, but it is more dangerous for some people. Infants and young children, people 72years of age and older, pregnant women, and people with certain health conditions or a weakened immune system are at greatest risk of flu complications. Pneumonia, bronchitis, sinus infections and ear infections are examples of flu-related complications. If you have a medical condition, such as heart disease, cancer or diabetes, flu can make it worse. Flu can cause fever and chills, sore throat, muscle aches, fatigue, cough, headache, and runny or stuffy nose. Some people may have vomiting and diarrhea, though this is more common in children than adults. Each year thousands of people in the [facility] die from flu, and many more are hospitalized. Flu vaccine prevents millions of illnesses and flu-related visits to the doctor each year. 2. Influenza vaccines CDC recommends everyone 10months of age and older get vaccinated every flu season. Children 6 months through 6years of age may need 2 doses during a single flu season. Everyone else needs only 1 dose each flu season. It takes about 2 weeks for protection to develop after vaccination. There are many flu viruses, and they are always changing. Each year a new flu vaccine is made to protect against three or four viruses that are likely to cause disease in the upcoming flu season.  Even when the vaccine doesnt exactly match these viruses, it may still provide some protection. Influenza vaccine does not cause flu. Influenza vaccine may be given at the same time as other vaccines. 3. Talk with your health care provider Tell your vaccine provider if the person getting the vaccine: 
 Has had an allergic reaction after a previous dose of influenza vaccine, or has any severe, life-threatening allergies.  Has ever had Guillain-Barré Syndrome (also called GBS). In some cases, your health care provider may decide to postpone influenza vaccination to a future visit. People with minor illnesses, such as a cold, may be vaccinated. People who are moderately or severely ill should usually wait until they recover before getting influenza vaccine. Your health care provider can give you more information. 4. Risks of a reaction  Soreness, redness, and swelling where shot is given, fever, muscle aches, and headache can happen after influenza vaccine.  There may be a very small increased risk of Guillain-Barré Syndrome (GBS) after inactivated influenza vaccine (the flu shot). [facility] children who get the flu shot along with pneumococcal vaccine (PCV13), and/or DTaP vaccine at the same time might be slightly more likely to have a seizure caused by fever. Tell your health care provider if a child who is getting flu vaccine has ever had a seizure. People sometimes faint after medical procedures, including vaccination. Tell your provider if you feel dizzy or have vision changes or ringing in the ears. As with any medicine, there is a very remote chance of a vaccine causing a severe allergic reaction, other serious injury, or death. 5. What if there is a serious problem? An allergic reaction could occur after the vaccinated person leaves the clinic.  If you see signs of a severe allergic reaction (hives, swelling of the face and throat, difficulty breathing, a fast heartbeat, dizziness, or weakness), call 9-1-1 and get the person to the nearest hospital. 
 
For other signs that concern you, call your health care provider. Adverse reactions should be reported to the Vaccine Adverse Event Reporting System (VAERS). Your health care provider will usually file this report, or you can do it yourself. Visit the VAERS website at www.vaers. hhs.gov or call 1-901.933.4889. VAERS is only for reporting reactions, and VAERS staff do not give medical advice. 6. The National Vaccine Injury Compensation Program 
 
The [facility] Vaccine Injury Compensation Program (VICP) is a federal program that was created to compensate people who may have been injured by certain vaccines. Visit the VICP website at www.hrsa.gov/vaccinecompensation or call 3-895.465.5903 to learn about the program and about filing a claim. There is a time limit to file a claim for compensation. 7. How can I learn more?  Ask your health care provider.  Call your local or state health department.  Contact the Centers for Disease Control and Prevention (CDC): 
- Call 3-131.849.4862 (8-503-ZTA-INFO) or 
- Visit CDCs influenza website at www.cdc.gov/flu Vaccine Information Statement (Interim) Inactivated Influenza Vaccine 8/15/2019 
[address] [id number] Department of Health and [employer] Centers for Disease Control and Prevention Office Use Only

## 2019-11-13 NOTE — PROGRESS NOTES
Subjective:   Audrey Simon is a [place] y.o. male, present with his wife. 5th visit with this physician.      has a past medical history of Acquired hypothyroidism (6/11/2018), Depression with anxiety (2/15/2018), Essential hypertension (2/15/2018), arterial ischemic stroke (2/15/2018), Mixed hyperlipidemia (2/15/2018), Noncompliance (11/14/2019), Other ill-defined conditions(799.89) (2010), Prediabetes (10/16/2018), and [facility] Did not do his FIT test.   Didn't do labs we ordered,   Didn't go to sleep study. Doesn't regularly takes his meds    Hypogonadism: symptoms include, chronic fatigue, depressed mood, decreased libido, ED, lack of energy. We tried it for about 3 shots, didn't notice that it helped. But he's not sexually active so doesn't want it anymore. Hypothyroidism: 08/2019 we up levothyroxine to 88mcg    HTN: BP better today, continue course     Depression anxiety: He report being stable, but says buspar \"made me feels weird\". His wife discussed with me in private about his short temper, and stress. Denies SI or HI  zoloft to 100mg   Write for clonazepam 0.5mg #15. Discussed risk for addiction, etc, plans for short term use. Not sleeping through the night  We'll try trazodone 50mg. Prediabetes a1C 5.9%    Weight loss, can't control appetite, topamax 25mg he said takes it once feels weird not taken it again. HLD: report labs done about 3 months ago.      He has a hx of CVA. She's unsure if it's his hearing or that he forgets. This has been > 1 year, slow progressive. He has a \"very nice hearing aid\", but he doesn't wears it. He have a neurologist at [facility]. But wanted to see someone within [employer] system. Not taking aspirin. Will need to restart      ROS:  Feeling well. No dyspnea or chest pain on exertion. No abdominal pain, change in bowel habits, black or bloody stools. No urinary tract or prostatic symptoms. No neurological complaints.       Patient Active Problem List    Diagnosis Date Noted    Noncompliance 11/14/2019    Prediabetes 10/16/2018    Acquired hypothyroidism 06/11/2018    Depression with anxiety 02/15/2018    Mixed hyperlipidemia 02/15/2018    Essential hypertension 02/15/2018    Hx of arterial ischemic stroke 02/15/2018     Current Outpatient Medications   Medication Sig Dispense Refill    predniSONE (STERAPRED) 5 mg dose pack See administration instruction per 5mg dose pack 21 Tab 0    traZODone (DESYREL) 50 mg tablet Take 1 Tab by mouth nightly. 30 Tab 1    levothyroxine (SYNTHROID) 88 mcg tablet TAKE ONE TABLET BY MOUTH EVERY MORNING ON AN EMPTY STOMACH 90 Tab 0    tamsulosin (FLOMAX) 0.4 mg capsule TAKE ONE CAPSULE BY MOUTH ONE TIME DAILY 90 Cap 1    hydroCHLOROthiazide (HYDRODIURIL) 25 mg tablet TAKE ONE TABLET BY MOUTH ONE TIME DAILY 90 Tab 1    topiramate (TOPAMAX) 25 mg tablet       sertraline (ZOLOFT) 100 mg tablet Take 1 Tab by mouth daily. 90 Tab 1    simvastatin (ZOCOR) 40 mg tablet TAKE ONE TABLET BY MOUTH AT BEDTIME 90 Tab 1    metoprolol succinate (TOPROL-XL) 50 mg XL tablet Take 1 Tab by mouth daily. 90 Tab 1    aspirin-acetaminophen-caffeine (EXCEDRIN MIGRAINE) 250-250-65 mg per tablet Take 2 Tabs by mouth every six (6) hours as needed for Headache.  loratadine (CLARITIN) 10 mg tablet Take 10 mg by mouth.  aspirin delayed-release 81 mg tablet Take 81 mg by mouth daily.  fluticasone (FLONASE ALLERGY RELIEF) 50 mcg/actuation nasal spray 2 Sprays by Both Nostrils route daily.  multivitamin capsule Take 1 Cap by mouth daily. Allergies   Allergen Reactions    Lisinopril Cough    Pcn [Penicillins] Unknown (comments)     Remembers something as a child about PCN     Past Surgical History:   Procedure Laterality Date    HX BACK SURGERY  2003    ? repaired slipped disc    HX TONSILLECTOMY      childhood     Family History   Problem Relation Age of Onset    Diabetes Father     Heart Attack Father  Cancer Brother 48        prostate     Social History     Tobacco Use    Smoking status: Never Smoker    Smokeless tobacco: Never Used   Substance Use Topics    Alcohol use: Yes     Comment: occasional        Objective:     Visit Vitals  /77   Pulse 80   Temp 98.7 °F (37.1 °C) (Oral)   Resp 18   Ht 5' 10\" (1.778 m)   Wt 238 lb (108 kg)   SpO2 96%   BMI 34.15 kg/m²     General:  Alert, cooperative, no distress, appears stated age. Head:  Normocephalic, without obvious abnormality, atraumatic. Eyes:  Conjunctivae/corneas clear. PERRL, EOMs intact. Neck: Supple, symmetrical, trachea midline, no carotid bruit and no JVD. Lungs:   Clear to auscultation bilaterally. Heart:  Regular rate and rhythm, S1, S2 normal, no murmur, click, rub or gallop. Abdomen:   Soft, non-tender. Extremities: Extremities normal, atraumatic, no cyanosis or edema. Pulses: 2+ and symmetric all extremities. Neurologic: CNII-XII intact. Normal strength, sensation and reflexes throughout. Assessment/Plan:     After he have left. His wife is also a patient of mine. She says that at night when he's sleeping she can hear him wheezes. He's not wheezing, or coughing here, exam was normal.   But I will write a prednisone taper course for him. Diagnoses and all orders for this visit:    1. Essential hypertension  -     TSH 3RD GENERATION  -     METABOLIC PANEL, COMPREHENSIVE    2. Acquired hypothyroidism    3. Mixed hyperlipidemia  -     METABOLIC PANEL, COMPREHENSIVE    4. Prediabetes  -     METABOLIC PANEL, COMPREHENSIVE  -     HEMOGLOBIN A1C W/O EAG    5. Hypogonadism in male  -     TSH 3RD GENERATION    6. Insomnia, unspecified type  -     traZODone (DESYREL) 50 mg tablet; Take 1 Tab by mouth nightly. 7. Noncompliance    8. Long-term use of high-risk medication  -     TSH 3RD GENERATION  -     METABOLIC PANEL, COMPREHENSIVE  -     CBC W/O DIFF  -     HEMOGLOBIN A1C W/O EAG    9.  Encounter for immunization  -     INFLUENZA VACCINE INACTIVATED (IIV), SUBUNIT, ADJUVANTED, IM  -     ADMIN INFLUENZA VIRUS VAC    10. Chronic bronchitis, unspecified chronic bronchitis type (HCC)  -     predniSONE (STERAPRED) 5 mg dose pack; See administration instruction per 5mg dose pack      Follow-up and Dispositions    · Return in about 10 weeks (around 1/22/2020) for Hypothyroid, HTN, HLD, meds.          Nelda Spence MD  11/14/2019

## 2019-11-13 NOTE — PROGRESS NOTES
Chief Complaint   Patient presents with    Hypertension    Cholesterol Problem    Thyroid Problem     2 mo check    1. Have you been to the ER, urgent care clinic since your last visit? Hospitalized since your last visit? no    2. Have you seen or consulted any other health care providers outside of the [address] [place] [address] since your last visit? Include any pap smears or colon screening. No    Order placed for Fluad, per Verbal Order from Dr. Marielena Fuchs on 11/13/2019 due to need for immunization. Eugenio Bourne is a 75 y.o. male who presents for routine immunizations. He denies any symptoms , reactions or allergies that would exclude them from being immunized today. Risks and adverse reactions were discussed and the VIS was given to them. All questions were addressed. He was observed for 15 min post injection. There were no reactions observed.     Marlys Dance, LPN

## 2019-11-14 PROBLEM — Z91.199 NONCOMPLIANCE: Status: ACTIVE | Noted: 2019-11-14

## 2019-11-14 RX ORDER — PREDNISONE 5 MG/1
TABLET ORAL
Qty: 21 TAB | Refills: 0 | OUTPATIENT
Start: 2019-11-14

## 2019-11-14 RX ORDER — PREDNISONE 5 MG/1
TABLET ORAL
Qty: 21 TAB | Refills: 0 | Status: SHIPPED | OUTPATIENT
Start: 2019-11-14 | End: 2020-07-08 | Stop reason: ALTCHOICE

## 2019-11-16 LAB
ALBUMIN SERPL-MCNC: 4.1 G/DL (ref 3.6–4.8)
ALBUMIN/GLOB SERPL: 1.4 {RATIO} (ref 1.2–2.2)
ALP SERPL-CCNC: 94 IU/L (ref 39–117)
ALT SERPL-CCNC: 29 IU/L (ref 0–44)
AST SERPL-CCNC: 38 IU/L (ref 0–40)
BILIRUB SERPL-MCNC: 0.7 MG/DL (ref 0–1.2)
BUN SERPL-MCNC: 15 MG/DL (ref 8–27)
BUN/CREAT SERPL: 13 (ref 10–24)
CALCIUM SERPL-MCNC: 9.3 MG/DL (ref 8.6–10.2)
CHLORIDE SERPL-SCNC: 107 MMOL/L (ref 96–106)
CO2 SERPL-SCNC: 17 MMOL/L (ref 20–29)
CREAT SERPL-MCNC: 1.16 MG/DL (ref 0.76–1.27)
ERYTHROCYTE [DISTWIDTH] IN BLOOD BY AUTOMATED COUNT: 16.6 % (ref 12.3–15.4)
GLOBULIN SER CALC-MCNC: 2.9 G/DL (ref 1.5–4.5)
GLUCOSE SERPL-MCNC: 100 MG/DL (ref 65–99)
HBA1C MFR BLD: 5.7 % (ref 4.8–5.6)
HCT VFR BLD AUTO: 51.5 % (ref 37.5–51)
HGB BLD-MCNC: 16.2 G/DL (ref 13–17.7)
MCH RBC QN AUTO: 28.4 PG (ref 26.6–33)
MCHC RBC AUTO-ENTMCNC: 31.5 G/DL (ref 31.5–35.7)
MCV RBC AUTO: 90 FL (ref 79–97)
PLATELET # BLD AUTO: 460 X10E3/UL (ref 150–450)
POTASSIUM SERPL-SCNC: 5.1 MMOL/L (ref 3.5–5.2)
PROT SERPL-MCNC: 7 G/DL (ref 6–8.5)
RBC # BLD AUTO: 5.71 X10E6/UL (ref 4.14–5.8)
SODIUM SERPL-SCNC: 143 MMOL/L (ref 134–144)
TSH SERPL DL<=0.005 MIU/L-ACNC: 4.45 UIU/ML (ref 0.45–4.5)
WBC # BLD AUTO: 10.7 X10E3/UL (ref 3.4–10.8)

## 2020-05-13 DIAGNOSIS — F41.8 DEPRESSION WITH ANXIETY: ICD-10-CM

## 2020-05-13 DIAGNOSIS — E03.9 ACQUIRED HYPOTHYROIDISM: ICD-10-CM

## 2020-05-13 DIAGNOSIS — R35.1 NOCTURIA: ICD-10-CM

## 2020-05-13 DIAGNOSIS — E78.2 MIXED HYPERLIPIDEMIA: ICD-10-CM

## 2020-05-13 DIAGNOSIS — I10 ESSENTIAL HYPERTENSION: ICD-10-CM

## 2020-05-13 RX ORDER — SERTRALINE HYDROCHLORIDE 100 MG/1
TABLET, FILM COATED ORAL
Qty: 90 TAB | Refills: 0 | Status: SHIPPED | OUTPATIENT
Start: 2020-05-13 | End: 2020-07-08 | Stop reason: SDDI

## 2020-05-13 RX ORDER — SIMVASTATIN 40 MG/1
TABLET, FILM COATED ORAL
Qty: 90 TAB | Refills: 0 | Status: SHIPPED | OUTPATIENT
Start: 2020-05-13 | End: 2020-07-08 | Stop reason: SDUPTHER

## 2020-05-13 RX ORDER — TOPIRAMATE 25 MG/1
TABLET ORAL
Qty: 180 TAB | Refills: 0 | Status: SHIPPED | OUTPATIENT
Start: 2020-05-13 | End: 2020-07-08 | Stop reason: SDUPTHER

## 2020-05-13 RX ORDER — LEVOTHYROXINE SODIUM 88 UG/1
TABLET ORAL
Qty: 90 TAB | Refills: 0 | Status: SHIPPED | OUTPATIENT
Start: 2020-05-13 | End: 2020-07-08 | Stop reason: SDUPTHER

## 2020-05-13 RX ORDER — TAMSULOSIN HYDROCHLORIDE 0.4 MG/1
CAPSULE ORAL
Qty: 90 CAP | Refills: 0 | Status: SHIPPED | OUTPATIENT
Start: 2020-05-13 | End: 2020-07-08 | Stop reason: SDUPTHER

## 2020-05-13 RX ORDER — HYDROCHLOROTHIAZIDE 25 MG/1
TABLET ORAL
Qty: 90 TAB | Refills: 0 | Status: SHIPPED | OUTPATIENT
Start: 2020-05-13 | End: 2020-07-08 | Stop reason: SDUPTHER

## 2020-05-13 NOTE — TELEPHONE ENCOUNTER
Pt Last seen >6 months ago  Med refill 3 month until f/u  pls call for F/u appointment    beckie Polanco MD  4/3/2018

## 2020-07-08 ENCOUNTER — TELEPHONE (OUTPATIENT)
Dept: FAMILY MEDICINE CLINIC | Age: 65
End: 2020-07-08

## 2020-07-08 ENCOUNTER — VIRTUAL VISIT (OUTPATIENT)
Dept: FAMILY MEDICINE CLINIC | Age: 65
End: 2020-07-08

## 2020-07-08 DIAGNOSIS — E03.9 ACQUIRED HYPOTHYROIDISM: ICD-10-CM

## 2020-07-08 DIAGNOSIS — Z79.899 ENCOUNTER FOR LONG-TERM (CURRENT) USE OF MEDICATIONS: ICD-10-CM

## 2020-07-08 DIAGNOSIS — F41.8 DEPRESSION WITH ANXIETY: ICD-10-CM

## 2020-07-08 DIAGNOSIS — G47.00 INSOMNIA, UNSPECIFIED TYPE: ICD-10-CM

## 2020-07-08 DIAGNOSIS — M25.461 PAIN AND SWELLING OF RIGHT KNEE: Primary | ICD-10-CM

## 2020-07-08 DIAGNOSIS — M25.561 PAIN AND SWELLING OF RIGHT KNEE: Primary | ICD-10-CM

## 2020-07-08 DIAGNOSIS — R73.03 PREDIABETES: ICD-10-CM

## 2020-07-08 DIAGNOSIS — I10 ESSENTIAL HYPERTENSION: ICD-10-CM

## 2020-07-08 DIAGNOSIS — N40.1 BENIGN PROSTATIC HYPERPLASIA WITH URINARY FREQUENCY: ICD-10-CM

## 2020-07-08 DIAGNOSIS — R35.0 BENIGN PROSTATIC HYPERPLASIA WITH URINARY FREQUENCY: ICD-10-CM

## 2020-07-08 DIAGNOSIS — E78.2 MIXED HYPERLIPIDEMIA: ICD-10-CM

## 2020-07-08 DIAGNOSIS — Z91.199 NONCOMPLIANCE: ICD-10-CM

## 2020-07-08 DIAGNOSIS — Z71.3 WEIGHT LOSS COUNSELING, ENCOUNTER FOR: ICD-10-CM

## 2020-07-08 RX ORDER — METOPROLOL SUCCINATE 50 MG/1
50 TABLET, EXTENDED RELEASE ORAL DAILY
Qty: 90 TAB | Refills: 1 | Status: SHIPPED | OUTPATIENT
Start: 2020-07-08 | End: 2021-03-15

## 2020-07-08 RX ORDER — LEVOTHYROXINE SODIUM 88 UG/1
88 TABLET ORAL
Qty: 90 TAB | Refills: 1 | Status: SHIPPED | OUTPATIENT
Start: 2020-07-08 | End: 2020-10-16 | Stop reason: DRUGHIGH

## 2020-07-08 RX ORDER — TOPIRAMATE 25 MG/1
TABLET ORAL
Qty: 180 TAB | Refills: 1 | Status: SHIPPED | OUTPATIENT
Start: 2020-07-08 | End: 2021-02-26

## 2020-07-08 RX ORDER — TAMSULOSIN HYDROCHLORIDE 0.4 MG/1
CAPSULE ORAL
Qty: 90 CAP | Refills: 1 | Status: SHIPPED | OUTPATIENT
Start: 2020-07-08 | End: 2021-02-26

## 2020-07-08 RX ORDER — HYDROCHLOROTHIAZIDE 25 MG/1
TABLET ORAL
Qty: 90 TAB | Refills: 1 | Status: SHIPPED | OUTPATIENT
Start: 2020-07-08 | End: 2021-02-26

## 2020-07-08 RX ORDER — TRAZODONE HYDROCHLORIDE 50 MG/1
50 TABLET ORAL
Qty: 90 TAB | Refills: 1 | Status: SHIPPED | OUTPATIENT
Start: 2020-07-08 | End: 2020-11-05 | Stop reason: ALTCHOICE

## 2020-07-08 RX ORDER — SIMVASTATIN 40 MG/1
40 TABLET, FILM COATED ORAL
Qty: 90 TAB | Refills: 1 | Status: SHIPPED | OUTPATIENT
Start: 2020-07-08 | End: 2020-11-05 | Stop reason: ALTCHOICE

## 2020-07-08 NOTE — PROGRESS NOTES
Consent: Luis Eduardo Tavarez, who was seen by synchronous (real-time) audio-video technology, and/or his healthcare decision maker, is aware that this patient-initiated, Telehealth encounter on 7/8/2020 is a billable service, with coverage as determined by his insurance carrier. He is aware that he may receive a bill and has provided verbal consent to proceed: Yes. Luis Eduardo Tavarez is a 77 y.o. male    Last saw in 11/2019       has a past medical history of Acquired hypothyroidism (6/11/2018), Depression with anxiety (2/15/2018), Essential hypertension (2/15/2018), arterial ischemic stroke (2/15/2018), Mixed hyperlipidemia (2/15/2018), Noncompliance (11/14/2019), Other ill-defined conditions(799.89) (2010), Prediabetes (10/16/2018), and Stroke ([facility] [address] Right knee twisted it 6 months ago. Not given out, or fall. Still slightly swollen right now after work. I told him he'll need apt with me in office for this  We'll get XR right knee     Hypothyroidism: 08/2019 we up levothyroxine to 88mcg      HTN: BP better today, continue course      Depression anxiety: He report being stable, but says buspar \"made me feels weird\". His wife discussed with me in private about his short temper, and stress. Denies SI or HI  zoloft to 100mg   Write for clonazepam 0.5mg #15. Discussed risk for addiction, etc, plans for short term use. Not sleeping through the night  We'll try trazodone 50mg.      Hypogonadism: symptoms include, chronic fatigue, depressed mood, decreased libido, ED, lack of energy. We tried it for about 3 shots, didn't notice that it helped. But he's not sexually active so doesn't want it anymore. Prediabetes a1C 5.8% 11/2019     Weight loss, can't control appetite, topamax 25mg he said takes it once feels weird not taken it again.      HLD: report labs done about 3 months ago.      He has a hx of CVA. She's unsure if it's his hearing or that he forgets.   This has been > 1 year, slow progressive. He has a \"very nice hearing aid\", but he doesn't wears it. He have a neurologist at [facility]. Reviewed: active problem list, medication list, allergies, notes from last encounter, lab results    A comprehensive review of systems was negative except for that written in the HPI. Assessment & Plan:   Diagnoses and all orders for this visit:    1. Pain and swelling of right knee  -     XR KNEE RT MAX 2 VWS; Future    2. Essential hypertension  -     METABOLIC PANEL, COMPREHENSIVE  -     TSH 3RD GENERATION  -     hydroCHLOROthiazide (HYDRODIURIL) 25 mg tablet; TAKE ONE TABLET BY MOUTH ONE TIME DAILY  -     metoprolol succinate (TOPROL-XL) 50 mg XL tablet; Take 1 Tab by mouth daily. 3. Acquired hypothyroidism  -     TSH 3RD GENERATION  -     levothyroxine (SYNTHROID) 88 mcg tablet; Take 1 Tab by mouth Daily (before breakfast). 4. Mixed hyperlipidemia  -     METABOLIC PANEL, COMPREHENSIVE  -     simvastatin (ZOCOR) 40 mg tablet; Take 1 Tab by mouth nightly. 5. Benign prostatic hyperplasia with urinary frequency  -     tamsulosin (FLOMAX) 0.4 mg capsule; TAKE ONE CAPSULE BY MOUTH ONE TIME DAILY    6. Insomnia, unspecified type  -     traZODone (DESYREL) 50 mg tablet; Take 1 Tab by mouth nightly. 7. Prediabetes  -     HEMOGLOBIN A1C W/O EAG    8. Depression with anxiety  -     METABOLIC PANEL, COMPREHENSIVE  -     TSH 3RD GENERATION    9. Noncompliance    10. Encounter for long-term (current) use of medications  -     HEMOGLOBIN A1C W/O EAG  -     METABOLIC PANEL, COMPREHENSIVE  -     TSH 3RD GENERATION    11. Weight loss counseling, encounter for  -     topiramate (TOPAMAX) 25 mg tablet; TAKE ONE TABLET BY MOUTH TWICE A DAY WITH MEAL(S)        Follow-up and Dispositions    · Return in about 2 weeks (around 7/22/2020) for in office apt for knee pain eval and labs.          I spent at least 25 minutes with this established patient, and >50% of the time was spent counseling and/or coordinating care regarding multiple concerns documented above  712  Subjective:   Stanley Tee is a 77 y.o. male who was seen for Hypertension; Cholesterol Problem; and Thyroid Problem      Prior to Admission medications    Medication Sig Start Date End Date Taking? Authorizing Provider   topiramate (TOPAMAX) 25 mg tablet TAKE ONE TABLET BY MOUTH TWICE A DAY WITH MEAL(S) 7/8/20  Yes Milvia Hollingsworth MD   tamsulosin (FLOMAX) 0.4 mg capsule TAKE ONE CAPSULE BY MOUTH ONE TIME DAILY 7/8/20  Yes Chaparrita Lara MD   levothyroxine (SYNTHROID) 88 mcg tablet Take 1 Tab by mouth Daily (before breakfast). 7/8/20  Yes Chaparrita Lara MD   hydroCHLOROthiazide (HYDRODIURIL) 25 mg tablet TAKE ONE TABLET BY MOUTH ONE TIME DAILY 7/8/20  Yes Chaparrita Lara MD   simvastatin (ZOCOR) 40 mg tablet Take 1 Tab by mouth nightly. 7/8/20  Yes Chaparrita Lara MD   traZODone (DESYREL) 50 mg tablet Take 1 Tab by mouth nightly. 7/8/20  Yes Chaparrita Lara MD   metoprolol succinate (TOPROL-XL) 50 mg XL tablet Take 1 Tab by mouth daily. 7/8/20  Yes Chaparrita Lara MD   aspirin-acetaminophen-caffeine [facility] MIGRAINE) 250-250-65 mg per tablet Take 2 Tabs by mouth every six (6) hours as needed for Headache. Yes Provider, Historical   loratadine (CLARITIN) 10 mg tablet Take 10 mg by mouth. Yes Provider, Historical   aspirin delayed-release 81 mg tablet Take 81 mg by mouth daily. Yes Provider, Historical   fluticasone (FLONASE ALLERGY RELIEF) 50 mcg/actuation nasal spray 2 Sprays by Both Nostrils route daily. Yes Provider, Historical   multivitamin capsule Take 1 Cap by mouth daily.    Yes Provider, Historical     Allergies   Allergen Reactions    Lisinopril Cough    Pcn [Penicillins] Unknown (comments)     Remembers something as a child about PCN       Patient Active Problem List    Diagnosis Date Noted    Noncompliance 11/14/2019    Prediabetes 10/16/2018    Acquired hypothyroidism 06/11/2018    Depression with anxiety 02/15/2018    Mixed hyperlipidemia 02/15/2018    Essential hypertension 02/15/2018    Hx of arterial ischemic stroke 02/15/2018     Past Medical History:   Diagnosis Date    Acquired hypothyroidism 6/11/2018    Depression with anxiety 2/15/2018    Essential hypertension 2/15/2018    Hx of arterial ischemic stroke 2/15/2018    Mixed hyperlipidemia 2/15/2018    Noncompliance 11/14/2019    Other ill-defined conditions(799.89) 2010    elevated PSA 6 months ago    Prediabetes 10/16/2018    Stroke [facility])      Past Surgical History:   Procedure Laterality Date    HX BACK SURGERY  2003    ? repaired slipped disc    HX TONSILLECTOMY      childhood         Objective:   Vital Signs: (As obtained by patient/caregiver at home)  There were no vitals taken for this visit.      Constitutional: [x] Appears well-developed and well-nourished [x] No apparent distress      [] Abnormal -     Mental status: [x] Alert and awake  [x] Oriented to person/place/time [x] Able to follow commands    [] Abnormal -     Eyes:   EOM    [x]  Normal    [] Abnormal -   Sclera  [x]  Normal    [] Abnormal -          Discharge [x]  None visible   [] Abnormal -     HENT: [x] Normocephalic, atraumatic  [] Abnormal -   [] Mouth/Throat: Mucous membranes are moist    External Ears [x] Normal  [] Abnormal -    Neck: [x] No visualized mass [] Abnormal -     Pulmonary/Chest: [x] Respiratory effort normal   [x] No visualized signs of difficulty breathing or respiratory distress        [] Abnormal -      Musculoskeletal:   [x] Normal gait with no signs of ataxia         [x] Normal range of motion of neck        [] Abnormal -     Neurological:        [x] No Facial Asymmetry (Cranial nerve 7 motor function) (limited exam due to video visit)          [x] No gaze palsy        [] Abnormal -          Skin:        [x] No significant exanthematous lesions or discoloration noted on facial skin         [] Abnormal -            Psychiatric:       [x] Normal Affect [] Abnormal - [x] No Hallucinations      We discussed the expected course, resolution and complications of the diagnosis(es) in detail. Medication risks, benefits, costs, interactions, and alternatives were discussed as indicated. I advised him to contact the office if his condition worsens, changes or fails to improve as anticipated. He expressed understanding with the diagnosis(es) and plan. Kal Kumari is a 77 y.o. male being evaluated by a video visit encounter for concerns as above. A caregiver was present when appropriate. Due to this being a TeleHealth encounter (During EQXGP-79 public health emergency), evaluation of the following organ systems was limited: Vitals/Constitutional/EENT/Resp/CV/GI//MS/Neuro/Skin/Heme-Lymph-Imm. Pursuant to the emergency declaration under the [facility][note 2[facility], 1135 waiver authority and the [employer] and [employer] Act, this Virtual  Visit was conducted, with patient's (and/or legal guardian's) consent, to reduce the patient's risk of exposure to COVID-19 and provide necessary medical care. Services were provided through a video synchronous discussion virtually to substitute for in-person clinic visit. Patient and provider were located at their individual homes.         Sonna Paget, MD

## 2020-07-08 NOTE — PROGRESS NOTES
Chief Complaint   Patient presents with    Hypertension    Cholesterol Problem    Thyroid Problem     Check meds    1. Have you been to the ER, urgent care clinic since your last visit? Hospitalized since your last visit? no    2. Have you seen or consulted any other health care providers outside of the [address] [place] [address] since your last visit? Include any pap smears or colon screening.  no

## 2020-07-08 NOTE — TELEPHONE ENCOUNTER
----- Message from Lorri English sent at 2020 11:28 AM EDT -----  Regarding: Dr. Simms Cornea  General/Vendor Call    :53    Caller:n/a    Reason:Pt waiting to be checked in virtually to do the appt.     Phone: [phone]    Details: Pt stating needs to be at work by 1pm.

## 2020-08-11 DIAGNOSIS — F41.8 DEPRESSION WITH ANXIETY: ICD-10-CM

## 2020-08-11 RX ORDER — SERTRALINE HYDROCHLORIDE 100 MG/1
TABLET, FILM COATED ORAL
Qty: 90 TAB | Refills: 0 | Status: SHIPPED | OUTPATIENT
Start: 2020-08-11 | End: 2020-11-05 | Stop reason: ALTCHOICE

## 2020-08-13 LAB
ALBUMIN SERPL-MCNC: 4.4 G/DL (ref 3.8–4.8)
ALBUMIN/GLOB SERPL: 1.8 {RATIO} (ref 1.2–2.2)
ALP SERPL-CCNC: 98 IU/L (ref 39–117)
ALT SERPL-CCNC: 28 IU/L (ref 0–44)
AST SERPL-CCNC: 23 IU/L (ref 0–40)
BILIRUB SERPL-MCNC: 0.5 MG/DL (ref 0–1.2)
BUN SERPL-MCNC: 21 MG/DL (ref 8–27)
BUN/CREAT SERPL: 22 (ref 10–24)
CALCIUM SERPL-MCNC: 9.4 MG/DL (ref 8.6–10.2)
CHLORIDE SERPL-SCNC: 98 MMOL/L (ref 96–106)
CO2 SERPL-SCNC: 27 MMOL/L (ref 20–29)
CREAT SERPL-MCNC: 0.94 MG/DL (ref 0.76–1.27)
GLOBULIN SER CALC-MCNC: 2.5 G/DL (ref 1.5–4.5)
GLUCOSE SERPL-MCNC: 98 MG/DL (ref 65–99)
HBA1C MFR BLD: 5.7 % (ref 4.8–5.6)
POTASSIUM SERPL-SCNC: 4 MMOL/L (ref 3.5–5.2)
PROT SERPL-MCNC: 6.9 G/DL (ref 6–8.5)
SODIUM SERPL-SCNC: 142 MMOL/L (ref 134–144)
TSH SERPL DL<=0.005 MIU/L-ACNC: 5.11 UIU/ML (ref 0.45–4.5)

## 2020-10-02 ENCOUNTER — VIRTUAL VISIT (OUTPATIENT)
Dept: FAMILY MEDICINE CLINIC | Age: 65
End: 2020-10-02
Payer: MEDICARE

## 2020-10-02 DIAGNOSIS — R07.89 CHEST DISCOMFORT: ICD-10-CM

## 2020-10-02 DIAGNOSIS — D72.9 ABNORMAL WBC COUNT: ICD-10-CM

## 2020-10-02 DIAGNOSIS — I10 ESSENTIAL HYPERTENSION: ICD-10-CM

## 2020-10-02 DIAGNOSIS — E78.2 MIXED HYPERLIPIDEMIA: ICD-10-CM

## 2020-10-02 DIAGNOSIS — Z09 HOSPITAL DISCHARGE FOLLOW-UP: Primary | ICD-10-CM

## 2020-10-02 DIAGNOSIS — Z91.89 RISK FACTORS FOR OBSTRUCTIVE SLEEP APNEA: ICD-10-CM

## 2020-10-02 DIAGNOSIS — Z86.73 HX OF ARTERIAL ISCHEMIC STROKE: ICD-10-CM

## 2020-10-02 PROCEDURE — 3017F COLORECTAL CA SCREEN DOC REV: CPT | Performed by: FAMILY MEDICINE

## 2020-10-02 PROCEDURE — 1101F PT FALLS ASSESS-DOCD LE1/YR: CPT | Performed by: FAMILY MEDICINE

## 2020-10-02 PROCEDURE — G8419 CALC BMI OUT NRM PARAM NOF/U: HCPCS | Performed by: FAMILY MEDICINE

## 2020-10-02 PROCEDURE — G9717 DOC PT DX DEP/BP F/U NT REQ: HCPCS | Performed by: FAMILY MEDICINE

## 2020-10-02 PROCEDURE — G8756 NO BP MEASURE DOC: HCPCS | Performed by: FAMILY MEDICINE

## 2020-10-02 PROCEDURE — G8536 NO DOC ELDER MAL SCRN: HCPCS | Performed by: FAMILY MEDICINE

## 2020-10-02 PROCEDURE — 99214 OFFICE O/P EST MOD 30 MIN: CPT | Performed by: FAMILY MEDICINE

## 2020-10-02 PROCEDURE — G8427 DOCREV CUR MEDS BY ELIG CLIN: HCPCS | Performed by: FAMILY MEDICINE

## 2020-10-02 RX ORDER — GLUCOSAM/CHONDRO/HERB 149/HYAL 750-100 MG
1 TABLET ORAL DAILY
COMMUNITY

## 2020-10-02 RX ORDER — MELATONIN
1000 DAILY
COMMUNITY
End: 2020-11-05

## 2020-10-02 NOTE — PROGRESS NOTES
Chief Complaint   Patient presents with   [facility] Follow Up     seen in ER       1. Have you been to the ER, urgent care clinic since your last visit? Hospitalized since your last visit? Yes ER [facility]    2. Have you seen or consulted any other health care providers outside of the [address] [place] [address] since your last visit? Include any pap smears or colon screening.  no

## 2020-10-02 NOTE — PROGRESS NOTES
Consent: Anaya Cotter, who was seen by synchronous (real-time) audio-video technology, and/or his healthcare decision maker, is aware that this patient-initiated, Telehealth encounter on 10/2/2020 is a billable service, with coverage as determined by his insurance carrier. He is aware that he may receive a bill and has provided verbal consent to proceed: Yes. Anaya Cotter is a 77 y.o. male    On the phone with his wife. Most of conversation is with her.        has a past medical history of Acquired hypothyroidism (6/11/2018), Depression with anxiety (2/15/2018), Essential hypertension (2/15/2018), arterial ischemic stroke (2/15/2018), Mixed hyperlipidemia (2/15/2018), Noncompliance (11/14/2019), Other ill-defined conditions(799.89) (2010), Prediabetes (10/16/2018), and Stroke ([facility] [address] 9/27/2020 He went to [address] [facility] ER for HA, clammy and didn't feel right, BP low. Did CT, blood work and EKG and cxR   Verbal report  EKG normal  Elevated WBC  Chem fine  CXR fine. CT showed old stroke  Was referred to Neurologist - apt 12/3/2020 (I had done this in 10/2018 and 6/2019) he just never went. Have been doing OK since. Resting at home. Says he'll \"be back to work this week\". Was at risk for JACKIE I last refer to sleep medicine 6/2019. They decide that he will get that done. Refer to new sleep medicine. Recheck CBC     There were no CP or SOB. But they were concern about his heart conditions and he does have risk factors. We'll order [employer] stress test.        Reviewed: active problem list, medication list, allergies, notes from last encounter, lab results    A comprehensive review of systems was negative except for that written in the HPI. Assessment & Plan:   Diagnoses and all orders for this visit:    1. Hospital discharge follow-up  -     CBC W/O DIFF    2. Abnormal WBC count  -     CBC W/O DIFF    3. Chest discomfort  -     EXERCISE CARDIAC STRESS TEST; Future    4.  Mixed hyperlipidemia  -     EXERCISE CARDIAC STRESS TEST; Future    5. Essential hypertension  -     EXERCISE CARDIAC STRESS TEST; Future    6. Hx of arterial ischemic stroke  -     EXERCISE CARDIAC STRESS TEST; Future    7. Risk factors for obstructive sleep apnea  -     REFERRAL TO SLEEP STUDIES        Follow-up and Dispositions    · Return in about 2 weeks (around 10/16/2020) for stress test and chronic conditions. I spent at least 25 minutes with this established patient, and >50% of the time was spent counseling and/or coordinating care regarding multiple concerns documented above  712  Subjective:   Nupur Ayers is a 77 y.o. male who was seen for Hospital Follow Up (seen in ER)      Prior to Admission medications    Medication Sig Start Date End Date Taking? Authorizing Provider   cholecalciferol (Vitamin D3) (1000 Units /25 mcg) tablet Take 1,000 Units by mouth daily. Yes Provider, Historical   omega 3-DHA-EPA-fish oil (Fish Oil) 1,000 mg (120 mg-180 mg) capsule Take 1 Cap by mouth daily. Yes Provider, Historical   topiramate (TOPAMAX) 25 mg tablet TAKE ONE TABLET BY MOUTH TWICE A DAY WITH MEAL(S) 7/8/20  Yes Darline Hollingsworth MD   tamsulosin (FLOMAX) 0.4 mg capsule TAKE ONE CAPSULE BY MOUTH ONE TIME DAILY 7/8/20  Yes Martha Fields MD   levothyroxine (SYNTHROID) 88 mcg tablet Take 1 Tab by mouth Daily (before breakfast). 7/8/20  Yes Martha Fields MD   hydroCHLOROthiazide (HYDRODIURIL) 25 mg tablet TAKE ONE TABLET BY MOUTH ONE TIME DAILY 7/8/20  Yes Martha Fields MD   simvastatin (ZOCOR) 40 mg tablet Take 1 Tab by mouth nightly. 7/8/20  Yes Martha Fields MD   metoprolol succinate (TOPROL-XL) 50 mg XL tablet Take 1 Tab by mouth daily. 7/8/20  Yes Martha Fields MD   aspirin-acetaminophen-caffeine [facility] MIGRAINE) 250-250-65 mg per tablet Take 2 Tabs by mouth every six (6) hours as needed for Headache. Yes Provider, Historical   loratadine (CLARITIN) 10 mg tablet Take 10 mg by mouth.    Yes Provider, Historical   aspirin delayed-release 81 mg tablet Take 81 mg by mouth daily. Yes Provider, Historical   fluticasone (FLONASE ALLERGY RELIEF) 50 mcg/actuation nasal spray 2 Sprays by Both Nostrils route daily. Yes Provider, Historical   multivitamin capsule Take 1 Cap by mouth daily. Yes Provider, Historical   sertraline (ZOLOFT) 100 mg tablet TAKE ONE TABLET BY MOUTH ONE TIME DAILY 8/11/20   Artie Bello MD   traZODone (DESYREL) 50 mg tablet Take 1 Tab by mouth nightly. 7/8/20   Artie Bello MD     Allergies   Allergen Reactions    Lisinopril Cough    Pcn [Penicillins] Unknown (comments)     Remembers something as a child about PCN       Patient Active Problem List    Diagnosis Date Noted    Noncompliance 11/14/2019    Prediabetes 10/16/2018    Acquired hypothyroidism 06/11/2018    Depression with anxiety 02/15/2018    Mixed hyperlipidemia 02/15/2018    Essential hypertension 02/15/2018    Hx of arterial ischemic stroke 02/15/2018     Past Medical History:   Diagnosis Date    Acquired hypothyroidism 6/11/2018    Depression with anxiety 2/15/2018    Essential hypertension 2/15/2018    Hx of arterial ischemic stroke 2/15/2018    Mixed hyperlipidemia 2/15/2018    Noncompliance 11/14/2019    Other ill-defined conditions(799.89) 2010    elevated PSA 6 months ago    Prediabetes 10/16/2018    Stroke [facility])      Past Surgical History:   Procedure Laterality Date    HX BACK SURGERY  2003    ? repaired slipped disc    HX TONSILLECTOMY      childhood       We discussed the expected course, resolution and complications of the diagnosis(es) in detail. Medication risks, benefits, costs, interactions, and alternatives were discussed as indicated. I advised him to contact the office if his condition worsens, changes or fails to improve as anticipated. He expressed understanding with the diagnosis(es) and plan.        Yuki Lara is a 77 y.o. male being evaluated by a video visit encounter for concerns as above. A caregiver was present when appropriate. Due to this being a TeleHealth encounter (During BGWGL-49 public health emergency), evaluation of the following organ systems was limited: Vitals/Constitutional/EENT/Resp/CV/GI//MS/Neuro/Skin/Heme-Lymph-Imm. Pursuant to the emergency declaration under the [facility], [facility] waiver authority and the [employer] and [employer] Act, this Virtual  Visit was conducted, with patient's (and/or legal guardian's) consent, to reduce the patient's risk of exposure to COVID-19 and provide necessary medical care. Services were provided through a video synchronous discussion virtually to substitute for in-person clinic visit. Patient and provider were located at their individual homes.         Dano Camacho MD

## 2020-10-06 ENCOUNTER — TELEPHONE (OUTPATIENT)
Dept: FAMILY MEDICINE CLINIC | Age: 65
End: 2020-10-06

## 2020-10-06 DIAGNOSIS — Z86.73 HX OF ARTERIAL ISCHEMIC STROKE: ICD-10-CM

## 2020-10-06 DIAGNOSIS — R07.89 CHEST DISCOMFORT: Primary | ICD-10-CM

## 2020-10-06 NOTE — TELEPHONE ENCOUNTER
----- Message from Sherry Leung sent at 10/6/2020  3:55 PM EDT -----  Regarding: Dr. Jesse Price / telephone  Caller's first and last name: [place] (wife)  Reason for call: Would like to change the location for the stress test from [facility] to [address] [place] [address] on [address]. also needs a new order for the stress test. Has made several attempts and would like immediate assistance and call back.   Callback required yes/no and why: yes  Best contact number(s): [phone]  Details to clarify the request: n/a

## 2020-10-16 ENCOUNTER — VIRTUAL VISIT (OUTPATIENT)
Dept: FAMILY MEDICINE CLINIC | Age: 65
End: 2020-10-16
Payer: MEDICARE

## 2020-10-16 ENCOUNTER — TELEPHONE (OUTPATIENT)
Dept: CARDIOLOGY CLINIC | Age: 65
End: 2020-10-16

## 2020-10-16 DIAGNOSIS — R73.03 PREDIABETES: ICD-10-CM

## 2020-10-16 DIAGNOSIS — R35.0 BENIGN PROSTATIC HYPERPLASIA WITH URINARY FREQUENCY: ICD-10-CM

## 2020-10-16 DIAGNOSIS — N40.1 BENIGN PROSTATIC HYPERPLASIA WITH URINARY FREQUENCY: ICD-10-CM

## 2020-10-16 DIAGNOSIS — I10 ESSENTIAL HYPERTENSION: Primary | ICD-10-CM

## 2020-10-16 DIAGNOSIS — G47.00 INSOMNIA, UNSPECIFIED TYPE: ICD-10-CM

## 2020-10-16 DIAGNOSIS — E03.9 ACQUIRED HYPOTHYROIDISM: ICD-10-CM

## 2020-10-16 DIAGNOSIS — I10 ESSENTIAL HYPERTENSION: ICD-10-CM

## 2020-10-16 DIAGNOSIS — F41.8 DEPRESSION WITH ANXIETY: ICD-10-CM

## 2020-10-16 DIAGNOSIS — R35.89 POLYURIA: Primary | ICD-10-CM

## 2020-10-16 DIAGNOSIS — R07.89 CHEST DISCOMFORT: ICD-10-CM

## 2020-10-16 DIAGNOSIS — D72.9 ABNORMAL WBC COUNT: ICD-10-CM

## 2020-10-16 DIAGNOSIS — Z91.89 RISK FACTORS FOR OBSTRUCTIVE SLEEP APNEA: ICD-10-CM

## 2020-10-16 DIAGNOSIS — Z91.199 NONCOMPLIANCE: ICD-10-CM

## 2020-10-16 DIAGNOSIS — Z79.899 ENCOUNTER FOR LONG-TERM (CURRENT) USE OF MEDICATIONS: ICD-10-CM

## 2020-10-16 PROCEDURE — G9717 DOC PT DX DEP/BP F/U NT REQ: HCPCS | Performed by: FAMILY MEDICINE

## 2020-10-16 PROCEDURE — G8419 CALC BMI OUT NRM PARAM NOF/U: HCPCS | Performed by: FAMILY MEDICINE

## 2020-10-16 PROCEDURE — G8536 NO DOC ELDER MAL SCRN: HCPCS | Performed by: FAMILY MEDICINE

## 2020-10-16 PROCEDURE — 99215 OFFICE O/P EST HI 40 MIN: CPT | Performed by: FAMILY MEDICINE

## 2020-10-16 PROCEDURE — 3017F COLORECTAL CA SCREEN DOC REV: CPT | Performed by: FAMILY MEDICINE

## 2020-10-16 PROCEDURE — G8756 NO BP MEASURE DOC: HCPCS | Performed by: FAMILY MEDICINE

## 2020-10-16 PROCEDURE — G8427 DOCREV CUR MEDS BY ELIG CLIN: HCPCS | Performed by: FAMILY MEDICINE

## 2020-10-16 PROCEDURE — 1101F PT FALLS ASSESS-DOCD LE1/YR: CPT | Performed by: FAMILY MEDICINE

## 2020-10-16 RX ORDER — LEVOTHYROXINE SODIUM 100 UG/1
100 TABLET ORAL
Qty: 30 TAB | Refills: 1 | Status: SHIPPED | OUTPATIENT
Start: 2020-10-16 | End: 2021-03-15

## 2020-10-16 NOTE — TELEPHONE ENCOUNTER
TC to ID verified.  Advised pt he needed to have COVID test prior to stress test. Also advised to hold metoprolol 24hr prior to his stress test. Pt understands and will go to [facility] [place] today before 3pm or tomorrow 7-10a for COVID test. Pt also wanted to make NP appt w Dr. Viky Willis for after his stress test.

## 2020-10-16 NOTE — PROGRESS NOTES
Consent: Dank Leblanc, who was seen by synchronous (real-time) audio-video technology, and/or his healthcare decision maker, is aware that this patient-initiated, Telehealth encounter on 10/16/2020 is a billable service, with coverage as determined by his insurance carrier. He is aware that he may receive a bill and has provided verbal consent to proceed: Yes. Dank Leblanc is a 77 y.o. male    On the phone with his wife. Most of conversation is with her. He's chonically noncompliant. He was c/o CP, SOB, I ordered stress echo and he didn't do it.      has a past medical history of Acquired hypothyroidism (6/11/2018), Depression with anxiety (2/15/2018), Essential hypertension (2/15/2018), arterial ischemic stroke (2/15/2018), Mixed hyperlipidemia (2/15/2018), Noncompliance (11/14/2019), Other ill-defined conditions(799.89) (2010), Prediabetes (10/16/2018), and Stroke ([facility] 9/27/2020 He went to [address] ER for HA, clammy and didn't feel right, BP low. Did CT, blood work and EKG and cxR   Verbal report  EKG normal  Elevated WBC  Chem fine  CXR fine. CT showed old stroke  Was referred to Neurologist - apt 12/3/2020 (I had done this in 10/2018 and 6/2019) he just never went. Have been doing OK since. Resting at home. Says he'll \"be back to work this week\". Was at risk for JACKIE I last refer to sleep medicine 6/2019. They decide that he will get that done. Refer to new sleep medicine. Still not done yet, he will schedule that    Recheck CBC he didn't do. There were no CP or SOB. But they were concern about his heart conditions and he does have risk factors. We'll order Treadmil stress test.   Which he missed and says he already rescheduled. Right knee twisted it 6 months ago. Not given out, or fall. Still slightly swollen right now after work. I told him he'll need apt with me in office for this  We'll get XR right knee    he didn't do. Says knee ok now.    Hypothyroidism: tsh slight high. Does feel tired, sluggishness. levothyroxine to 88mcg   I equivocal, but he has symptoms and his wife thinks he needs higher dose. We'll increase to 100mcg    HTN: BP better today, continue course      Depression anxiety: He report being stable, but says buspar \"made me feels weird\". His wife discussed with me in private about his short temper, and stress. Denies SI or HI  Not taking zoloft to 100mg   Previously wrote for clonazepam 0.5mg #15. Discussed risk for addiction, etc, plans for short term use.                 Not sleeping through the night  Last visit we started trazodone 50mg.   He's not taking   Says he gets enough sleep with naps    Hypogonadism: symptoms include, chronic fatigue, depressed mood, decreased libido, ED, lack of energy.    We tried it for about 3 shots, didn't notice that it helped.  But he's not sexually active so doesn't want it anymore.      Prediabetes a1C 5.7% 08/2020, 5.8% 11/2019     Weight loss, can't control appetite, topamax 25mg he said takes it once feels weird not taken it again.      HLD: report labs done about 3 months ago.      He has a hx of CVA.  She's unsure if it's his hearing or that he forgets.  This has been > 1 year, slow progressive. Vania Jose has a \"very nice hearing aid\", but he doesn't wears it. Vania Jose have a neurologist at [facility].          Reviewed: active problem list, medication list, allergies, notes from last encounter, lab results    A comprehensive review of systems was negative except for that written in the HPI. Assessment & Plan:   Diagnoses and all orders for this visit:    1. Polyuria  -     PSA W/ REFLX FREE PSA  -     URINALYSIS W/ RFLX MICROSCOPIC    2. Benign prostatic hyperplasia with urinary frequency  -     PSA W/ REFLX FREE PSA  -     URINALYSIS W/ RFLX MICROSCOPIC    3. Abnormal WBC count  -     CBC W/O DIFF  -     URINALYSIS W/ RFLX MICROSCOPIC    4. Chest discomfort    5.  Risk factors for obstructive sleep apnea    6. Depression with anxiety  -     TSH RFX ON ABNORMAL TO FREE T4; Future    7. Acquired hypothyroidism  -     levothyroxine (SYNTHROID) 100 mcg tablet; Take 1 Tab by mouth Daily (before breakfast). -     TSH RFX ON ABNORMAL TO FREE T4; Future    8. Insomnia, unspecified type    9. Prediabetes    10. Noncompliance    11. Encounter for long-term (current) use of medications        Follow-up and Dispositions    · Return in about 2 weeks (around 10/30/2020) for treadmill stress test, f/u on labs and multiple issues. I spent at least 25 minutes with this established patient, and >50% of the time was spent counseling and/or coordinating care regarding multiple concerns documented above  712  Subjective:   Izabel Martinez is a 77 y.o. male who was seen for Hypertension and Follow Up Chronic Condition      Prior to Admission medications    Medication Sig Start Date End Date Taking? Authorizing Provider   levothyroxine (SYNTHROID) 100 mcg tablet Take 1 Tab by mouth Daily (before breakfast). 10/16/20  Yes Joleen Last MD   cholecalciferol (Vitamin D3) (1000 Units /25 mcg) tablet Take 1,000 Units by mouth daily. Yes Provider, Historical   omega 3-DHA-EPA-fish oil (Fish Oil) 1,000 mg (120 mg-180 mg) capsule Take 1 Cap by mouth daily. Yes Provider, Historical   topiramate (TOPAMAX) 25 mg tablet TAKE ONE TABLET BY MOUTH TWICE A DAY WITH MEAL(S) 7/8/20  Yes Kezia Hollingsworth MD   tamsulosin (FLOMAX) 0.4 mg capsule TAKE ONE CAPSULE BY MOUTH ONE TIME DAILY 7/8/20  Yes Kezia Hollingsworth MD   hydroCHLOROthiazide (HYDRODIURIL) 25 mg tablet TAKE ONE TABLET BY MOUTH ONE TIME DAILY 7/8/20  Yes Joleen Last MD   simvastatin (ZOCOR) 40 mg tablet Take 1 Tab by mouth nightly. 7/8/20  Yes Joleen Last MD   metoprolol succinate (TOPROL-XL) 50 mg XL tablet Take 1 Tab by mouth daily.  7/8/20  Yes Joleen Last MD   aspirin-acetaminophen-caffeine [facility] MIGRAINE) 250-250-65 mg per tablet Take 2 Tabs by mouth every six (6) hours as needed for Headache. Yes Provider, Historical   loratadine (CLARITIN) 10 mg tablet Take 10 mg by mouth. Yes Provider, Historical   aspirin delayed-release 81 mg tablet Take 81 mg by mouth daily. Yes Provider, Historical   fluticasone (FLONASE ALLERGY RELIEF) 50 mcg/actuation nasal spray 2 Sprays by Both Nostrils route daily. Yes Provider, Historical   multivitamin capsule Take 1 Cap by mouth daily. Yes Provider, Historical   sertraline (ZOLOFT) 100 mg tablet TAKE ONE TABLET BY MOUTH ONE TIME DAILY 8/11/20   Drew Collazo MD   traZODone (DESYREL) 50 mg tablet Take 1 Tab by mouth nightly. 7/8/20   Drew Collazo MD     Allergies   Allergen Reactions    Lisinopril Cough    Pcn [Penicillins] Unknown (comments)     Remembers something as a child about PCN       Patient Active Problem List    Diagnosis Date Noted    Noncompliance 11/14/2019    Prediabetes 10/16/2018    Acquired hypothyroidism 06/11/2018    Depression with anxiety 02/15/2018    Mixed hyperlipidemia 02/15/2018    Essential hypertension 02/15/2018    Hx of arterial ischemic stroke 02/15/2018     Past Medical History:   Diagnosis Date    Acquired hypothyroidism 6/11/2018    Depression with anxiety 2/15/2018    Essential hypertension 2/15/2018    Hx of arterial ischemic stroke 2/15/2018    Mixed hyperlipidemia 2/15/2018    Noncompliance 11/14/2019    Other ill-defined conditions(799.89) 2010    elevated PSA 6 months ago    Prediabetes 10/16/2018    Stroke [facility])      Past Surgical History:   Procedure Laterality Date    HX BACK SURGERY  2003    ? repaired slipped disc    HX TONSILLECTOMY      childhood       We discussed the expected course, resolution and complications of the diagnosis(es) in detail. Medication risks, benefits, costs, interactions, and alternatives were discussed as indicated. I advised him to contact the office if his condition worsens, changes or fails to improve as anticipated.  He expressed understanding with the diagnosis(es) and plan. Lidia Munoz is a 77 y.o. male being evaluated by a video visit encounter for concerns as above. A caregiver was present when appropriate. Due to this being a TeleHealth encounter (During ODJBL-41 public health emergency), evaluation of the following organ systems was limited: Vitals/Constitutional/EENT/Resp/CV/GI//MS/Neuro/Skin/Heme-Lymph-Imm. Pursuant to the emergency declaration under the [address] [place] [address], [facility] waiver authority and the [employer] Act, this Virtual  Visit was conducted, with patient's (and/or legal guardian's) consent, to reduce the patient's risk of exposure to COVID-19 and provide necessary medical care. Services were provided through a video synchronous discussion virtually to substitute for in-person clinic visit. Patient and provider were located at their individual homes.         Julieta Roe MD

## 2020-10-16 NOTE — PROGRESS NOTES
[phone] doxy. me    Concerns today:  Did not get labs done or stress test done-had to reschedule to 10/20    1. Have you been to the ER, urgent care clinic since your last visit? Hospitalized since your last visit? Yes Reason for visit: [facility] 9/27/20 for BP low    2. Have you seen or consulted any other health care providers outside of the [address] [place] [address] since your last visit? Include any pap smears or colon screening.  No

## 2020-10-17 ENCOUNTER — HOSPITAL ENCOUNTER (OUTPATIENT)
Dept: PREADMISSION TESTING | Age: 65
Discharge: HOME | End: 2020-10-17
Payer: MEDICARE

## 2020-10-17 ENCOUNTER — TRANSCRIBE ORDER (OUTPATIENT)
Dept: REGISTRATION | Age: 65
End: 2020-10-17

## 2020-10-17 PROCEDURE — 87635 SARS-COV-2 COVID-19 AMP PRB: CPT

## 2020-10-18 LAB — SARS-COV-2, COV2NT: NOT DETECTED

## 2020-10-20 ENCOUNTER — ANCILLARY PROCEDURE (OUTPATIENT)
Dept: CARDIOLOGY CLINIC | Age: 65
End: 2020-10-20
Payer: MEDICARE

## 2020-10-20 VITALS — HEIGHT: 70 IN | BODY MASS INDEX: 34.07 KG/M2 | WEIGHT: 238 LBS

## 2020-10-20 DIAGNOSIS — R07.89 CHEST DISCOMFORT: ICD-10-CM

## 2020-10-20 DIAGNOSIS — I10 ESSENTIAL HYPERTENSION: ICD-10-CM

## 2020-10-20 DIAGNOSIS — Z86.73 HX OF ARTERIAL ISCHEMIC STROKE: ICD-10-CM

## 2020-10-20 DIAGNOSIS — E78.2 MIXED HYPERLIPIDEMIA: ICD-10-CM

## 2020-10-20 LAB
STRESS ANGINA INDEX: 0
STRESS BASELINE DIAS BP: 92 MMHG
STRESS BASELINE HR: 97 BPM
STRESS BASELINE SYS BP: 142 MMHG
STRESS ESTIMATED WORKLOAD: 4.6 METS
STRESS EXERCISE DUR MIN: NORMAL MIN:SEC
STRESS O2 SAT REST: 96 %
STRESS PEAK DIAS BP: 80 MMHG
STRESS PEAK SYS BP: 160 MMHG
STRESS PERCENT HR ACHIEVED: 84 %
STRESS POST PEAK HR: 129 BPM
STRESS RATE PRESSURE PRODUCT: NORMAL BPM*MMHG
STRESS SR DUKE TREADMILL SCORE: 1
STRESS ST DEPRESSION: 0 MM
STRESS ST ELEVATION: 0 MM
STRESS TARGET HR: 153 BPM

## 2020-10-20 PROCEDURE — 93015 CV STRESS TEST SUPVJ I&R: CPT | Performed by: INTERNAL MEDICINE

## 2020-11-05 ENCOUNTER — OFFICE VISIT (OUTPATIENT)
Dept: CARDIOLOGY CLINIC | Age: 65
End: 2020-11-05
Payer: MEDICARE

## 2020-11-05 VITALS
OXYGEN SATURATION: 97 % | HEIGHT: 70 IN | BODY MASS INDEX: 33.79 KG/M2 | HEART RATE: 84 BPM | RESPIRATION RATE: 14 BRPM | SYSTOLIC BLOOD PRESSURE: 120 MMHG | DIASTOLIC BLOOD PRESSURE: 84 MMHG | WEIGHT: 236 LBS

## 2020-11-05 DIAGNOSIS — I10 ESSENTIAL HYPERTENSION: ICD-10-CM

## 2020-11-05 DIAGNOSIS — R07.89 CHEST DISCOMFORT: ICD-10-CM

## 2020-11-05 DIAGNOSIS — E78.2 MIXED HYPERLIPIDEMIA: Primary | ICD-10-CM

## 2020-11-05 PROCEDURE — 93000 ELECTROCARDIOGRAM COMPLETE: CPT | Performed by: INTERNAL MEDICINE

## 2020-11-05 PROCEDURE — 99204 OFFICE O/P NEW MOD 45 MIN: CPT | Performed by: INTERNAL MEDICINE

## 2020-11-05 PROCEDURE — G8754 DIAS BP LESS 90: HCPCS | Performed by: INTERNAL MEDICINE

## 2020-11-05 PROCEDURE — G8752 SYS BP LESS 140: HCPCS | Performed by: INTERNAL MEDICINE

## 2020-11-05 RX ORDER — ROSUVASTATIN CALCIUM 40 MG/1
40 TABLET, COATED ORAL
Qty: 90 TAB | Refills: 3 | Status: SHIPPED | OUTPATIENT
Start: 2020-11-05 | End: 2021-03-25 | Stop reason: SDUPTHER

## 2020-11-05 RX ORDER — MELATONIN
5000 DAILY
Qty: 90 TAB | Refills: 3
Start: 2020-11-05 | End: 2020-12-08

## 2020-11-05 NOTE — PROGRESS NOTES
[place]: Kacy Wright  [phone]  Requesting/referring provider: Dr. Ct Yoder  Reason for Consult: LANZA    HPI: Dank Leblanc, a 77y.o. year-old who presents for evaluation of exertional dyspnea, he thinks it is getting harder over time, has to take more breaks doing yardwork he has also gained weight, just not feeling like he is in good shape   Thinks that his tst is low. Balance is off and he did falls, tripped. No edema. No PND or orthopnea normally, but he did have one event where he was shrot of breath, and diaphoretic and not breathing well. BP was low at the time. Weak diaphragm since his CVA about 6 years ago  When he is tied he coughs and chokes(speech told him to go slow and chew slow but he doesn't always do it)  Recently had an episode where he tipped over and stopped breathing. Witness by his wife, lasted a few seconds. Also has seen pulmonary  Sleeps more than 12 hours a day but gets at night to pee  To see neurology in December  To have JACKIE testing, lots of snoring. We discussed his symptoms which are quite concerning. There is a possibility that he has sick sinus syndrome but it is also entirely possible this is some sort of cardiac syncope with his weakness and fatigue and shortness of breath. So at this point needs to proceed with stress testing echo and loop monitoring to evaluate for sick sinus syndrome coronary artery disease pulmonary hypertension related to his unknown treated sleep apnea and to look for heart failure and structural heart disease. Assessment/Plan:  1. HTN- at goal on meds  2. Body mass index is 33.86 kg/m². 3. Unable to exercise for stress test  4. CVA hx- had dizziness and what felt like sinus trouble, c 2014. Left facial droop, treated at [facility]  -Continue aspirin  5. Depression/anxiety  6. IGT/ preDM  7. LANZA- anginal equivalent needs lexiscan nuc and echo to look for cardiac pathology.    8. Hyperlipidemia- on simvastatin- and still high-we will likely need additional agents unacceptable in the setting of prior stroke. Change to Crestor today    Fhx no early coronary disease  Soc no smoking no significant alcohol  He  has a past medical history of Acquired hypothyroidism (6/11/2018), Depression with anxiety (2/15/2018), Essential hypertension (2/15/2018), arterial ischemic stroke (2/15/2018), Mixed hyperlipidemia (2/15/2018), Noncompliance (11/14/2019), Other ill-defined conditions(799.89) (2010), Prediabetes (10/16/2018), and Stroke ([facility] Cardiovascular ROS: positive for - dyspnea on exertion  Respiratory ROS: positive for - shortness of breath  Neurological ROS: positive for - syncope  All other systems negative except as above. PE  Vitals:    11/05/20 1423   BP: 120/84   Pulse: 84   Resp: 14   SpO2: 97%   Weight: 236 lb (107 kg)   Height: 5' 10\" (1.778 m)    Body mass index is 33.86 kg/m².    General appearance - alert, well appearing, and in no distress  Mental status - affect appropriate to mood  Eyes - sclera anicteric, moist mucous membranes  Neck - supple, no significant adenopathy  Lymphatics - no  lymphadenopathy  Chest - clear to auscultation, no wheezes, rales or rhonchi  Heart - normal rate, regular rhythm, normal S1, S2, no murmurs, rubs, clicks or gallops  Abdomen - soft, nontender, nondistended, no masses or organomegaly  Back exam - full range of motion, no tenderness  Neurological - cranial nerves II through XII grossly intact, no focal deficit  Musculoskeletal - no muscular tenderness noted, normal strength  Extremities - peripheral pulses normal, no pedal edema  Skin - normal coloration  no rashes    Recent Labs:  Lab Results   Component Value Date/Time    Cholesterol, total 237 (H) 08/02/2019 09:59 AM    HDL Cholesterol 43 08/02/2019 09:59 AM    LDL, calculated 153 (H) 08/02/2019 09:59 AM    Triglyceride 206 (H) 08/02/2019 09:59 AM     Lab Results   Component Value Date/Time    Creatinine 0.94 08/12/2020 04:16 PM     Lab Results   Component Value Date/Time    BUN 21 08/12/2020 04:16 PM     Lab Results   Component Value Date/Time    Potassium 4.0 08/12/2020 04:16 PM     Lab Results   Component Value Date/Time    Hemoglobin A1c 5.7 (H) 08/12/2020 04:16 PM     Lab Results   Component Value Date/Time    HGB 16.2 11/13/2019 04:38 PM     Lab Results   Component Value Date/Time    PLATELET 220 (H) 36/74/0735 04:38 PM       Reviewed:  Past Medical History:   Diagnosis Date    Acquired hypothyroidism 6/11/2018    Depression with anxiety 2/15/2018    Essential hypertension 2/15/2018    Hx of arterial ischemic stroke 2/15/2018    Mixed hyperlipidemia 2/15/2018    Noncompliance 11/14/2019    Other ill-defined conditions(799.89) 2010    elevated PSA 6 months ago    Prediabetes 10/16/2018    Stroke [facility])      Social History     Tobacco Use   Smoking Status Never Smoker   Smokeless Tobacco Never Used     Social History     Substance and Sexual Activity   Alcohol Use Yes    Frequency: Monthly or less    Drinks per session: 1 or 2    Binge frequency: Never    Comment: occasional     Allergies   Allergen Reactions    Lisinopril Cough    Pcn [Penicillins] Unknown (comments)     Remembers something as a child about PCN       Current Outpatient Medications   Medication Sig    levothyroxine (SYNTHROID) 100 mcg tablet Take 1 Tab by mouth Daily (before breakfast).  cholecalciferol (Vitamin D3) (1000 Units /25 mcg) tablet Take 1,000 Units by mouth daily.  omega 3-DHA-EPA-fish oil (Fish Oil) 1,000 mg (120 mg-180 mg) capsule Take 1 Cap by mouth daily.     sertraline (ZOLOFT) 100 mg tablet TAKE ONE TABLET BY MOUTH ONE TIME DAILY    topiramate (TOPAMAX) 25 mg tablet TAKE ONE TABLET BY MOUTH TWICE A DAY WITH MEAL(S)    tamsulosin (FLOMAX) 0.4 mg capsule TAKE ONE CAPSULE BY MOUTH ONE TIME DAILY    hydroCHLOROthiazide (HYDRODIURIL) 25 mg tablet TAKE ONE TABLET BY MOUTH ONE TIME DAILY    simvastatin (ZOCOR) 40 mg tablet Take 1 Tab by mouth nightly.  traZODone (DESYREL) 50 mg tablet Take 1 Tab by mouth nightly.  metoprolol succinate (TOPROL-XL) 50 mg XL tablet Take 1 Tab by mouth daily.  aspirin-acetaminophen-caffeine (EXCEDRIN MIGRAINE) 250-250-65 mg per tablet Take 2 Tabs by mouth every six (6) hours as needed for Headache.  loratadine (CLARITIN) 10 mg tablet Take 10 mg by mouth.  aspirin delayed-release 81 mg tablet Take 81 mg by mouth daily.  fluticasone (FLONASE ALLERGY RELIEF) 50 mcg/actuation nasal spray 2 Sprays by Both Nostrils route daily.  multivitamin capsule Take 1 Cap by mouth daily. No current facility-administered medications for this visit.         Jose Burciaga MD  [facility] [place]  [address] [facility]  [address]

## 2020-11-18 DIAGNOSIS — F41.8 DEPRESSION WITH ANXIETY: ICD-10-CM

## 2020-11-18 RX ORDER — SERTRALINE HYDROCHLORIDE 100 MG/1
TABLET, FILM COATED ORAL
Qty: 90 TAB | Refills: 1 | Status: SHIPPED | OUTPATIENT
Start: 2020-11-18 | End: 2021-03-25 | Stop reason: SDUPTHER

## 2020-11-27 DIAGNOSIS — F41.8 DEPRESSION WITH ANXIETY: ICD-10-CM

## 2020-11-27 DIAGNOSIS — E03.9 ACQUIRED HYPOTHYROIDISM: ICD-10-CM

## 2020-12-07 ENCOUNTER — TELEPHONE (OUTPATIENT)
Dept: CARDIOLOGY CLINIC | Age: 65
End: 2020-12-07

## 2020-12-07 NOTE — TELEPHONE ENCOUNTER
ZHANG with detailed instruction for stress testing. Advised if around anyone with COVID or COVID symptoms to r/s.

## 2020-12-08 ENCOUNTER — CLINICAL SUPPORT (OUTPATIENT)
Dept: CARDIOLOGY CLINIC | Age: 65
End: 2020-12-08
Payer: MEDICARE

## 2020-12-08 ENCOUNTER — ANCILLARY PROCEDURE (OUTPATIENT)
Dept: CARDIOLOGY CLINIC | Age: 65
End: 2020-12-08
Payer: MEDICARE

## 2020-12-08 ENCOUNTER — OFFICE VISIT (OUTPATIENT)
Dept: CARDIOLOGY CLINIC | Age: 65
End: 2020-12-08
Payer: MEDICARE

## 2020-12-08 VITALS
HEIGHT: 70 IN | DIASTOLIC BLOOD PRESSURE: 84 MMHG | WEIGHT: 236 LBS | BODY MASS INDEX: 33.79 KG/M2 | SYSTOLIC BLOOD PRESSURE: 120 MMHG

## 2020-12-08 VITALS
HEIGHT: 70 IN | SYSTOLIC BLOOD PRESSURE: 110 MMHG | BODY MASS INDEX: 33.79 KG/M2 | WEIGHT: 236 LBS | DIASTOLIC BLOOD PRESSURE: 70 MMHG

## 2020-12-08 VITALS — DIASTOLIC BLOOD PRESSURE: 70 MMHG | HEART RATE: 83 BPM | SYSTOLIC BLOOD PRESSURE: 110 MMHG

## 2020-12-08 DIAGNOSIS — F41.8 DEPRESSION WITH ANXIETY: ICD-10-CM

## 2020-12-08 DIAGNOSIS — Z86.73 HX OF ARTERIAL ISCHEMIC STROKE: ICD-10-CM

## 2020-12-08 DIAGNOSIS — R07.89 CHEST DISCOMFORT: ICD-10-CM

## 2020-12-08 DIAGNOSIS — E78.2 MIXED HYPERLIPIDEMIA: ICD-10-CM

## 2020-12-08 DIAGNOSIS — I10 ESSENTIAL HYPERTENSION: ICD-10-CM

## 2020-12-08 DIAGNOSIS — R73.03 PREDIABETES: ICD-10-CM

## 2020-12-08 DIAGNOSIS — E78.2 MIXED HYPERLIPIDEMIA: Primary | ICD-10-CM

## 2020-12-08 DIAGNOSIS — R00.2 PALPITATIONS: Primary | ICD-10-CM

## 2020-12-08 LAB
ECHO AO ASC DIAM: 4.41 CM
ECHO AO ROOT DIAM: 3.74 CM
ECHO AV AREA PEAK VELOCITY: 3.66 CM2
ECHO AV AREA VTI: 4.55 CM2
ECHO AV AREA/BSA PEAK VELOCITY: 1.6 CM2/M2
ECHO AV AREA/BSA VTI: 2 CM2/M2
ECHO AV MEAN GRADIENT: 2.71 MMHG
ECHO AV PEAK GRADIENT: 4.93 MMHG
ECHO AV PEAK VELOCITY: 111.05 CM/S
ECHO AV VTI: 17.44 CM
ECHO EST RA PRESSURE: 3 MMHG
ECHO LA AREA 4C: 14.07 CM2
ECHO LA MAJOR AXIS: 3.8 CM
ECHO LA MINOR AXIS: 1.7 CM
ECHO LA VOL 2C: 37.02 ML (ref 18–58)
ECHO LA VOL 4C: 29.32 ML (ref 18–58)
ECHO LA VOL BP: 35.62 ML (ref 18–58)
ECHO LA VOL/BSA BIPLANE: 15.91 ML/M2 (ref 16–28)
ECHO LA VOLUME INDEX A2C: 16.53 ML/M2 (ref 16–28)
ECHO LA VOLUME INDEX A4C: 13.09 ML/M2 (ref 16–28)
ECHO LV E' LATERAL VELOCITY: 8.58 CM/S
ECHO LV E' SEPTAL VELOCITY: 5.73 CM/S
ECHO LV EDV A2C: 65.41 ML
ECHO LV EDV A4C: 76.39 ML
ECHO LV EDV BP: 71.99 ML (ref 67–155)
ECHO LV EDV INDEX A4C: 34.1 ML/M2
ECHO LV EDV INDEX BP: 32.1 ML/M2
ECHO LV EDV NDEX A2C: 29.2 ML/M2
ECHO LV EJECTION FRACTION A2C: 62 PERCENT
ECHO LV EJECTION FRACTION A4C: 58 PERCENT
ECHO LV EJECTION FRACTION BIPLANE: 60.8 PERCENT (ref 55–100)
ECHO LV ESV A2C: 24.69 ML
ECHO LV ESV A4C: 32.1 ML
ECHO LV ESV BP: 28.23 ML (ref 22–58)
ECHO LV ESV INDEX A2C: 11 ML/M2
ECHO LV ESV INDEX A4C: 14.3 ML/M2
ECHO LV ESV INDEX BP: 12.6 ML/M2
ECHO LV INTERNAL DIMENSION DIASTOLIC: 2.65 CM (ref 4.2–5.9)
ECHO LV INTERNAL DIMENSION SYSTOLIC: 1.97 CM
ECHO LV IVSD: 1.07 CM (ref 0.6–1)
ECHO LV MASS 2D: 77.7 G (ref 88–224)
ECHO LV MASS INDEX 2D: 34.7 G/M2 (ref 49–115)
ECHO LV POSTERIOR WALL DIASTOLIC: 1.09 CM (ref 0.6–1)
ECHO LVOT DIAM: 2.2 CM
ECHO LVOT PEAK GRADIENT: 4.61 MMHG
ECHO LVOT PEAK VELOCITY: 107.37 CM/S
ECHO LVOT SV: 79.4 ML
ECHO LVOT VTI: 20.96 CM
ECHO MV A VELOCITY: 74.74 CM/S
ECHO MV AREA PHT: 3.57 CM2
ECHO MV E DECELERATION TIME (DT): 212.45 MS
ECHO MV E VELOCITY: 36.98 CM/S
ECHO MV E/A RATIO: 0.49
ECHO MV E/E' LATERAL: 4.31
ECHO MV E/E' RATIO (AVERAGED): 5.38
ECHO MV E/E' SEPTAL: 6.45
ECHO MV PRESSURE HALF TIME (PHT): 61.61 MS
LA VOL DISK BP: 33.11 ML (ref 18–58)
LVOT MG: 2.85 MMHG

## 2020-12-08 PROCEDURE — G8754 DIAS BP LESS 90: HCPCS | Performed by: INTERNAL MEDICINE

## 2020-12-08 PROCEDURE — G8417 CALC BMI ABV UP PARAM F/U: HCPCS | Performed by: INTERNAL MEDICINE

## 2020-12-08 PROCEDURE — 93227 XTRNL ECG REC<48 HR R&I: CPT | Performed by: INTERNAL MEDICINE

## 2020-12-08 PROCEDURE — 1100F PTFALLS ASSESS-DOCD GE2>/YR: CPT | Performed by: INTERNAL MEDICINE

## 2020-12-08 PROCEDURE — A9500 TC99M SESTAMIBI: HCPCS | Performed by: INTERNAL MEDICINE

## 2020-12-08 PROCEDURE — 78452 HT MUSCLE IMAGE SPECT MULT: CPT | Performed by: INTERNAL MEDICINE

## 2020-12-08 PROCEDURE — G8427 DOCREV CUR MEDS BY ELIG CLIN: HCPCS | Performed by: INTERNAL MEDICINE

## 2020-12-08 PROCEDURE — 93225 XTRNL ECG REC<48 HRS REC: CPT | Performed by: INTERNAL MEDICINE

## 2020-12-08 PROCEDURE — 93015 CV STRESS TEST SUPVJ I&R: CPT | Performed by: INTERNAL MEDICINE

## 2020-12-08 PROCEDURE — 93306 TTE W/DOPPLER COMPLETE: CPT | Performed by: INTERNAL MEDICINE

## 2020-12-08 PROCEDURE — 3288F FALL RISK ASSESSMENT DOCD: CPT | Performed by: INTERNAL MEDICINE

## 2020-12-08 PROCEDURE — G9717 DOC PT DX DEP/BP F/U NT REQ: HCPCS | Performed by: INTERNAL MEDICINE

## 2020-12-08 PROCEDURE — G8752 SYS BP LESS 140: HCPCS | Performed by: INTERNAL MEDICINE

## 2020-12-08 PROCEDURE — 3017F COLORECTAL CA SCREEN DOC REV: CPT | Performed by: INTERNAL MEDICINE

## 2020-12-08 PROCEDURE — G8536 NO DOC ELDER MAL SCRN: HCPCS | Performed by: INTERNAL MEDICINE

## 2020-12-08 PROCEDURE — 99213 OFFICE O/P EST LOW 20 MIN: CPT | Performed by: INTERNAL MEDICINE

## 2020-12-08 RX ORDER — TETRAKIS(2-METHOXYISOBUTYLISOCYANIDE)COPPER(I) TETRAFLUOROBORATE 1 MG/ML
10 INJECTION, POWDER, LYOPHILIZED, FOR SOLUTION INTRAVENOUS ONCE
Status: COMPLETED | OUTPATIENT
Start: 2020-12-08 | End: 2020-12-08

## 2020-12-08 RX ORDER — TETRAKIS(2-METHOXYISOBUTYLISOCYANIDE)COPPER(I) TETRAFLUOROBORATE 1 MG/ML
30 INJECTION, POWDER, LYOPHILIZED, FOR SOLUTION INTRAVENOUS ONCE
Status: COMPLETED | OUTPATIENT
Start: 2020-12-08 | End: 2020-12-08

## 2020-12-08 RX ORDER — CHOLECALCIFEROL TAB 125 MCG (5000 UNIT) 125 MCG
5000 TAB ORAL DAILY
Qty: 90 TAB | Refills: 3 | Status: SHIPPED | OUTPATIENT
Start: 2020-12-08

## 2020-12-08 RX ADMIN — TETRAKIS(2-METHOXYISOBUTYLISOCYANIDE)COPPER(I) TETRAFLUOROBORATE 25.9 MILLICURIE: 1 INJECTION, POWDER, LYOPHILIZED, FOR SOLUTION INTRAVENOUS at 14:00

## 2020-12-08 RX ADMIN — TETRAKIS(2-METHOXYISOBUTYLISOCYANIDE)COPPER(I) TETRAFLUOROBORATE 8.1 MILLICURIE: 1 INJECTION, POWDER, LYOPHILIZED, FOR SOLUTION INTRAVENOUS at 12:35

## 2020-12-08 NOTE — PROGRESS NOTES
[place]: John Fletcher  [phone]  Requesting/referring provider: Dr. Jesse Price  Reason for Consult: LANZA    HPI: Joy Wyatt, a 77y.o. year-old who presents for evaluation of exertional dyspnea, he thinks it is getting harder over time, has to take more breaks doing yardwork he has also gained weight, just not feeling like he is in good shape   Thinks that his tst is low. Balance is off and he did falls, tripped. No edema. No PND or orthopnea normally, but he did have one event where he was shrot of breath, and diaphoretic and not breathing well. BP was low at the time. Weak diaphragm since his CVA about 6 years ago  When he is tied he coughs and chokes(speech told him to go slow and chew slow but he doesn't always do it)  Recently had an episode where he tipped over and stopped breathing. Witness by his wife, lasted a few seconds. Also has seen pulmonary  Sleeps more than 12 hours a day but gets at night to pee  Did see Dr. Rafat Kim, plans for [facility] and PT for his wobbling and balance. Has had a few falls,   To have JACKIE testing, lots of snoring. - Saw Dr. Rafat Kim last week, sleep study still pending. Final loop recorder pending sleep testing pending    We reviewed his test results today which showed preserved ejection fraction and no evidence of coronary artery disease which is excellent news for him. We will continue to work on medical management of his CAD risk factors. Doing okay on rosuvastatin 40 vitamin D repletion underway he is on a baby aspirin. Blood pressure is under control with hydrochlorothiazide and metoprolol. We discussed his migraine Excedrin use. Assessment/Plan:  1. HTN- at goal on meds  2. There is no height or weight on file to calculate BMI. 3. Unable to exercise for stress test  4. CVA hx- had dizziness and what felt like sinus trouble, c 2014. Left facial droop, [facility] at [facility]  -Continue aspirin  5. Depression/anxiety  6. IGT/ preDM  7.  LANZA- anginal equivalent needs lexiscan nuc and echo to look for cardiac pathology. 8. Hyperlipidemia- on simvastatin- and still high-we will likely need additional agents unacceptable in the setting of prior stroke. Change to Crestor today    Fhx no early coronary disease  Soc no smoking no significant alcohol  He  has a past medical history of Acquired hypothyroidism (6/11/2018), Depression with anxiety (2/15/2018), Essential hypertension (2/15/2018), arterial ischemic stroke (2/15/2018), Mixed hyperlipidemia (2/15/2018), Noncompliance (11/14/2019), Other ill-defined conditions(799.89) (2010), Prediabetes (10/16/2018), and Stroke ([facility] Cardiovascular ROS: positive for - dyspnea on exertion  Respiratory ROS: positive for - shortness of breath  Neurological ROS: positive for - syncope  All other systems negative except as above. PE  Vitals:    12/08/20 1520   BP: 110/70   Pulse: 83    There is no height or weight on file to calculate BMI.    General appearance - alert, well appearing, and in no distress  Mental status - affect appropriate to mood  Eyes - sclera anicteric, moist mucous membranes  Neck - supple, no significant adenopathy  Lymphatics - no  lymphadenopathy  Chest - clear to auscultation, no wheezes, rales or rhonchi  Heart - normal rate, regular rhythm, normal S1, S2, no murmurs, rubs, clicks or gallops  Abdomen - soft, nontender, nondistended, no masses or organomegaly  Back exam - full range of motion, no tenderness  Neurological - cranial nerves II through XII grossly intact, no focal deficit  Musculoskeletal - no muscular tenderness noted, normal strength  Extremities - peripheral pulses normal, no pedal edema  Skin - normal coloration  no rashes    Recent Labs:  Lab Results   Component Value Date/Time    Cholesterol, total 237 (H) 08/02/2019 09:59 AM    HDL Cholesterol 43 08/02/2019 09:59 AM    LDL, calculated 153 (H) 08/02/2019 09:59 AM    Triglyceride 206 (H) 08/02/2019 09:59 AM     Lab Results Component Value Date/Time    Creatinine 0.94 08/12/2020 04:16 PM     Lab Results   Component Value Date/Time    BUN 21 08/12/2020 04:16 PM     Lab Results   Component Value Date/Time    Potassium 4.0 08/12/2020 04:16 PM     Lab Results   Component Value Date/Time    Hemoglobin A1c 5.7 (H) 08/12/2020 04:16 PM     Lab Results   Component Value Date/Time    HGB 16.2 11/13/2019 04:38 PM     Lab Results   Component Value Date/Time    PLATELET 856 (H) 86/55/9832 04:38 PM       Reviewed:  Past Medical History:   Diagnosis Date    Acquired hypothyroidism 6/11/2018    Depression with anxiety 2/15/2018    Essential hypertension 2/15/2018    Hx of arterial ischemic stroke 2/15/2018    Mixed hyperlipidemia 2/15/2018    Noncompliance 11/14/2019    Other ill-defined conditions(799.89) 2010    elevated PSA 6 months ago    Prediabetes 10/16/2018    Stroke [facility])      Social History     Tobacco Use   Smoking Status Never Smoker   Smokeless Tobacco Never Used     Social History     Substance and Sexual Activity   Alcohol Use Yes    Frequency: Monthly or less    Drinks per session: 1 or 2    Binge frequency: Never    Comment: occasional     Allergies   Allergen Reactions    Lisinopril Cough    Pcn [Penicillins] Unknown (comments)     Remembers something as a child about PCN       Current Outpatient Medications   Medication Sig    rosuvastatin (CRESTOR) 40 mg tablet Take 1 Tab by mouth nightly.  cholecalciferol (Vitamin D3) (1000 Units /25 mcg) tablet Take 5 Tabs by mouth daily.  levothyroxine (SYNTHROID) 100 mcg tablet Take 1 Tab by mouth Daily (before breakfast).  omega 3-DHA-EPA-fish oil (Fish Oil) 1,000 mg (120 mg-180 mg) capsule Take 1 Cap by mouth daily.     topiramate (TOPAMAX) 25 mg tablet TAKE ONE TABLET BY MOUTH TWICE A DAY WITH MEAL(S)    tamsulosin (FLOMAX) 0.4 mg capsule TAKE ONE CAPSULE BY MOUTH ONE TIME DAILY    hydroCHLOROthiazide (HYDRODIURIL) 25 mg tablet TAKE ONE TABLET BY MOUTH ONE TIME DAILY    metoprolol succinate (TOPROL-XL) 50 mg XL tablet Take 1 Tab by mouth daily.  aspirin-acetaminophen-caffeine (EXCEDRIN MIGRAINE) 250-250-65 mg per tablet Take 2 Tabs by mouth every six (6) hours as needed for Headache.  loratadine (CLARITIN) 10 mg tablet Take 10 mg by mouth.  aspirin delayed-release 81 mg tablet Take 81 mg by mouth daily.  fluticasone (FLONASE ALLERGY RELIEF) 50 mcg/actuation nasal spray 2 Sprays by Both Nostrils route daily.  multivitamin capsule Take 1 Cap by mouth daily.  sertraline (ZOLOFT) 100 mg tablet TAKE ONE TABLET BY MOUTH ONE TIME DAILY     No current facility-administered medications for this visit.         Kevin Jeffrey MD  [facility] heart and V[facility] [place]  [address] [facility] [address]  [address]

## 2020-12-09 LAB
STRESS BASELINE DIAS BP: 70 MMHG
STRESS BASELINE HR: 83 BPM
STRESS BASELINE SYS BP: 110 MMHG
STRESS O2 SAT PEAK: 95 %
STRESS O2 SAT REST: 96 %
STRESS PEAK DIAS BP: 66 MMHG
STRESS PEAK SYS BP: 94 MMHG
STRESS PERCENT HR ACHIEVED: 63 %
STRESS POST PEAK HR: 96 BPM
STRESS RATE PRESSURE PRODUCT: 9024 BPM*MMHG
STRESS TARGET HR: 153 BPM

## 2020-12-22 ENCOUNTER — TELEPHONE (OUTPATIENT)
Dept: CARDIOLOGY CLINIC | Age: 65
End: 2020-12-22

## 2021-02-21 DIAGNOSIS — Z71.3 WEIGHT LOSS COUNSELING, ENCOUNTER FOR: ICD-10-CM

## 2021-02-21 DIAGNOSIS — N40.1 BENIGN PROSTATIC HYPERPLASIA WITH URINARY FREQUENCY: ICD-10-CM

## 2021-02-21 DIAGNOSIS — I10 ESSENTIAL HYPERTENSION: ICD-10-CM

## 2021-02-21 DIAGNOSIS — R35.0 BENIGN PROSTATIC HYPERPLASIA WITH URINARY FREQUENCY: ICD-10-CM

## 2021-02-24 ENCOUNTER — DOCUMENTATION ONLY (OUTPATIENT)
Dept: FAMILY MEDICINE CLINIC | Age: 66
End: 2021-02-24

## 2021-02-24 DIAGNOSIS — I10 ESSENTIAL HYPERTENSION: ICD-10-CM

## 2021-02-26 RX ORDER — TOPIRAMATE 25 MG/1
TABLET ORAL
Qty: 60 TAB | Refills: 0 | Status: SHIPPED | OUTPATIENT
Start: 2021-02-26 | End: 2021-03-25 | Stop reason: SDUPTHER

## 2021-02-26 RX ORDER — TAMSULOSIN HYDROCHLORIDE 0.4 MG/1
CAPSULE ORAL
Qty: 30 CAP | Refills: 0 | Status: SHIPPED | OUTPATIENT
Start: 2021-02-26 | End: 2021-03-25 | Stop reason: SDUPTHER

## 2021-02-26 RX ORDER — HYDROCHLOROTHIAZIDE 25 MG/1
TABLET ORAL
Qty: 30 TAB | Refills: 0 | Status: SHIPPED | OUTPATIENT
Start: 2021-02-26 | End: 2021-03-25 | Stop reason: SDUPTHER

## 2021-03-25 ENCOUNTER — VIRTUAL VISIT (OUTPATIENT)
Dept: FAMILY MEDICINE CLINIC | Age: 66
End: 2021-03-25
Payer: MEDICARE

## 2021-03-25 DIAGNOSIS — F41.8 DEPRESSION WITH ANXIETY: ICD-10-CM

## 2021-03-25 DIAGNOSIS — Z91.199 NONCOMPLIANCE: ICD-10-CM

## 2021-03-25 DIAGNOSIS — R35.0 BENIGN PROSTATIC HYPERPLASIA WITH URINARY FREQUENCY: ICD-10-CM

## 2021-03-25 DIAGNOSIS — Z79.899 ENCOUNTER FOR LONG-TERM (CURRENT) USE OF MEDICATIONS: ICD-10-CM

## 2021-03-25 DIAGNOSIS — R73.03 PREDIABETES: ICD-10-CM

## 2021-03-25 DIAGNOSIS — E78.2 MIXED HYPERLIPIDEMIA: ICD-10-CM

## 2021-03-25 DIAGNOSIS — Z86.73 HX OF ARTERIAL ISCHEMIC STROKE: ICD-10-CM

## 2021-03-25 DIAGNOSIS — N40.1 BENIGN PROSTATIC HYPERPLASIA WITH URINARY FREQUENCY: ICD-10-CM

## 2021-03-25 DIAGNOSIS — E03.9 ACQUIRED HYPOTHYROIDISM: ICD-10-CM

## 2021-03-25 DIAGNOSIS — G47.00 INSOMNIA, UNSPECIFIED TYPE: ICD-10-CM

## 2021-03-25 DIAGNOSIS — Z71.3 WEIGHT LOSS COUNSELING, ENCOUNTER FOR: ICD-10-CM

## 2021-03-25 DIAGNOSIS — I10 ESSENTIAL HYPERTENSION: ICD-10-CM

## 2021-03-25 DIAGNOSIS — Z00.00 MEDICARE ANNUAL WELLNESS VISIT, SUBSEQUENT: Primary | ICD-10-CM

## 2021-03-25 PROCEDURE — G8756 NO BP MEASURE DOC: HCPCS | Performed by: FAMILY MEDICINE

## 2021-03-25 PROCEDURE — G8428 CUR MEDS NOT DOCUMENT: HCPCS | Performed by: FAMILY MEDICINE

## 2021-03-25 PROCEDURE — 99214 OFFICE O/P EST MOD 30 MIN: CPT | Performed by: FAMILY MEDICINE

## 2021-03-25 PROCEDURE — 3288F FALL RISK ASSESSMENT DOCD: CPT | Performed by: FAMILY MEDICINE

## 2021-03-25 PROCEDURE — 1100F PTFALLS ASSESS-DOCD GE2>/YR: CPT | Performed by: FAMILY MEDICINE

## 2021-03-25 PROCEDURE — 3017F COLORECTAL CA SCREEN DOC REV: CPT | Performed by: FAMILY MEDICINE

## 2021-03-25 PROCEDURE — G8417 CALC BMI ABV UP PARAM F/U: HCPCS | Performed by: FAMILY MEDICINE

## 2021-03-25 PROCEDURE — G0439 PPPS, SUBSEQ VISIT: HCPCS | Performed by: FAMILY MEDICINE

## 2021-03-25 PROCEDURE — G8536 NO DOC ELDER MAL SCRN: HCPCS | Performed by: FAMILY MEDICINE

## 2021-03-25 PROCEDURE — G9717 DOC PT DX DEP/BP F/U NT REQ: HCPCS | Performed by: FAMILY MEDICINE

## 2021-03-25 RX ORDER — TAMSULOSIN HYDROCHLORIDE 0.4 MG/1
0.4 CAPSULE ORAL DAILY
Qty: 90 CAP | Refills: 0 | Status: SHIPPED | OUTPATIENT
Start: 2021-03-25 | End: 2021-08-27

## 2021-03-25 RX ORDER — METOPROLOL SUCCINATE 50 MG/1
TABLET, EXTENDED RELEASE ORAL
Qty: 90 TAB | Refills: 0 | Status: SHIPPED | OUTPATIENT
Start: 2021-03-25 | End: 2022-01-13 | Stop reason: SDUPTHER

## 2021-03-25 RX ORDER — SERTRALINE HYDROCHLORIDE 100 MG/1
TABLET, FILM COATED ORAL
Qty: 90 TAB | Refills: 0 | Status: SHIPPED | OUTPATIENT
Start: 2021-03-25 | End: 2021-05-25

## 2021-03-25 RX ORDER — HYDROCHLOROTHIAZIDE 25 MG/1
25 TABLET ORAL DAILY
Qty: 90 TAB | Refills: 0 | Status: SHIPPED | OUTPATIENT
Start: 2021-03-25 | End: 2021-08-27

## 2021-03-25 RX ORDER — LEVOTHYROXINE SODIUM 100 UG/1
TABLET ORAL
Qty: 90 TAB | Refills: 0 | Status: SHIPPED | OUTPATIENT
Start: 2021-03-25 | End: 2022-01-13 | Stop reason: SDUPTHER

## 2021-03-25 RX ORDER — ROSUVASTATIN CALCIUM 40 MG/1
40 TABLET, COATED ORAL
Qty: 90 TAB | Refills: 0
Start: 2021-03-25 | End: 2022-01-13 | Stop reason: SDUPTHER

## 2021-03-25 RX ORDER — TOPIRAMATE 25 MG/1
TABLET ORAL
Qty: 180 TAB | Refills: 0 | Status: SHIPPED | OUTPATIENT
Start: 2021-03-25 | End: 2021-08-27

## 2021-03-25 NOTE — PROGRESS NOTES
Consent: Adalid Quinn, who was seen by synchronous (real-time) audio-video technology, and/or his healthcare decision maker, is aware that this patient-initiated, Telehealth encounter on 3/25/2021 is a billable service, with coverage as determined by his insurance carrier. He is aware that he may receive a bill and has provided verbal consent to proceed: Yes. Adalid Quinn is a 77 y.o. male      This is a Rocio-John Exam (AWV)     I have reviewed the patient's medical history in detail and updated the computerized patient record. has a past medical history of Acquired hypothyroidism (6/11/2018), Depression with anxiety (2/15/2018), Essential hypertension (2/15/2018), arterial ischemic stroke (2/15/2018), Mixed hyperlipidemia (2/15/2018), Noncompliance (11/14/2019), Other ill-defined conditions(799.89) (2010), Prediabetes (10/16/2018), and Stroke ([facility] [address] He's chonically noncompliant. Noncompliant to meds, f/up, labs order or x-rays. He was c/o CP, SOB, I ordered stress echo and he didn't do it. Then his wife called and rushed us. We referred to Cardio. He missed f/up apt with us and cardio. He eventually did all that. Says he never had CP, it was his wife's concern. Saw dr. Sakina Cihrinos cardiologist had cardiac work up. Work up was normal but he's higher risk due to his age, lifestyle and chronic medical conditions. 9/27/2020 He went to [address] [place] [address] ER for HA  CT showed old stroke  Was referred to Neurologist - apt 12/3/2020 (I had done this in 10/2018 and 6/2019) he just never went. Have been doing OK since. Was at risk for JACKIE I last refer to sleep medicine 6/2019. Did another refer in 2020. Finally got it done a few weeks ago but still haven't result.      Hypothyroidism: tsh slight high. Does feel tired, sluggishness.   I had increased Levothyroxine to 100mcg  He didn't do f/u labs     HTN: BP at home at goal  Continue meds      Depression anxiety: He report being stable  Denies SI or HI  zoloft to 100mg                 Hypogonadism: symptoms include, chronic fatigue, depressed mood, decreased libido, ED, lack of energy.    We tried it for about 3 shots, didn't notice that it helped.  But he's not sexually active so doesn't want it anymore.      Prediabetes a1C 5.7% 08/2020, 5.8% 11/2019     Weight loss, can't control appetite,   topamax 25mg he said takes it once feels weird not taken it again.      HLD: crestor 40mg       Reviewed: active problem list, medication list, allergies, notes from last encounter, lab results    A comprehensive review of systems was negative except for that written in the HPI. Alcohol Risk Factor Screening: On any occasion in the past three months you have had more than 7 drinks containing alcohol    Functional Ability and Level of Safety:     Hearing Loss   The patient wears hearing aids. Activities of Daily Living   Self-care. Requires assistance with: no ADLs    Fall Risk     Fall Risk Assessment, last 12 mths 3/25/2021   Able to walk? Yes   Fall in past 12 months? 1   Do you feel unsteady? 1   Are you worried about falling 1   Number of falls in past 12 months 1   Fall with injury? 0       Abuse Screen   Patient is not abused    Review of Systems   A comprehensive review of systems was negative except for that written in the HPI.     Physical Examination     Evaluation of Cognitive Function:  Mood/affect:  neutral  Appearance: age appropriate, bearded and casually dressed  Family member/caregiver input: no other concerns today    Patient Care Team:  Delilah Gama MD as PCP - General (Family Medicine)  Delilah Gama MD as PCP - REHABILITATION [facility] Empaneled Provider  Tomasa Kendall MD (Cardiology)    Advice/Referrals/Counseling   Education and counseling provided:  Are appropriate based on today's review and evaluation  End-of-Life planning (with patient's consent)  Pneumococcal Vaccine  Influenza Vaccine  Prostate cancer screening tests (PSA, covered annually)  Colorectal cancer screening tests  Diabetes screening test      Assessment & Plan:   Diagnoses and all orders for this visit:    1. Medicare annual wellness visit, subsequent    2. Noncompliance    3. Essential hypertension  -     metoprolol succinate (TOPROL-XL) 50 mg XL tablet; TAKE ONE TABLET BY MOUTH ONE TIME DAILY  -     hydroCHLOROthiazide (HYDRODIURIL) 25 mg tablet; Take 1 Tab by mouth daily.  -     METABOLIC PANEL, COMPREHENSIVE; Future  -     TSH 3RD GENERATION; Future    4. Acquired hypothyroidism  -     levothyroxine (SYNTHROID) 100 mcg tablet; TAKE ONE TABLET BY MOUTH ONE TIME DAILY BEFORE BREAKFAST  -     TSH 3RD GENERATION; Future    5. Mixed hyperlipidemia  -     rosuvastatin (CRESTOR) 40 mg tablet; Take 1 Tab by mouth nightly. -     LIPID PANEL; Future  -     METABOLIC PANEL, COMPREHENSIVE; Future  -     TSH 3RD GENERATION; Future    6. Hx of arterial ischemic stroke  -     rosuvastatin (CRESTOR) 40 mg tablet; Take 1 Tab by mouth nightly. 7. Prediabetes  -     HEMOGLOBIN A1C WITH EAG; Future    8. Benign prostatic hyperplasia with urinary frequency  -     tamsulosin (FLOMAX) 0.4 mg capsule; Take 1 Cap by mouth daily.  -     PSA SCREENING (SCREENING); Future    9. Depression with anxiety  -     sertraline (ZOLOFT) 100 mg tablet; TAKE ONE TABLET BY MOUTH ONE TIME DAILY  -     METABOLIC PANEL, COMPREHENSIVE; Future    10. Insomnia, unspecified type    11. Weight loss counseling, encounter for  -     topiramate (TOPAMAX) 25 mg tablet; TAKE ONE TABLET BY MOUTH TWICE A DAY WITH MEALS    12. Encounter for long-term (current) use of medications  -     CBC W/O DIFF; Future  -     HEMOGLOBIN A1C WITH EAG; Future  -     LIPID PANEL; Future  -     METABOLIC PANEL, COMPREHENSIVE; Future  -     TSH 3RD GENERATION; Future  -     PSA SCREENING (SCREENING);  Future      Follow-up and Dispositions    · Return in about 4 months (around 7/25/2021) for HTN, HLD, prediabetes, depression, meds, labs. Follow-up and Disposition History        712  Subjective:   Surinder Hernandez is a 77 y.o. male who was seen for Follow Up Chronic Condition      Prior to Admission medications    Medication Sig Start Date End Date Taking? Authorizing Provider   metoprolol succinate (TOPROL-XL) 50 mg XL tablet TAKE ONE TABLET BY MOUTH ONE TIME DAILY 3/25/21  Yes Ruben John MD   levothyroxine (SYNTHROID) 100 mcg tablet TAKE ONE TABLET BY MOUTH ONE TIME DAILY BEFORE BREAKFAST 3/25/21  Yes Gautam Hollingsworth MD   hydroCHLOROthiazide (HYDRODIURIL) 25 mg tablet Take 1 Tab by mouth daily. 3/25/21  Yes Ruben John MD   tamsulosin (FLOMAX) 0.4 mg capsule Take 1 Cap by mouth daily. 3/25/21  Yes Ruben John MD   topiramate (TOPAMAX) 25 mg tablet TAKE ONE TABLET BY MOUTH TWICE A DAY WITH MEALS 3/25/21  Yes Gautam Hollingsworth MD   rosuvastatin (CRESTOR) 40 mg tablet Take 1 Tab by mouth nightly. 3/25/21  Yes Ruben John MD   sertraline (ZOLOFT) 100 mg tablet TAKE ONE TABLET BY MOUTH ONE TIME DAILY 3/25/21  Yes Ruben John MD   cholecalciferol (VITAMIN D3) (5000 Units/125 mcg) tab tablet Take 1 Tab by mouth daily. 12/8/20  Yes Jose Dunn MD   omega 2-ZSI-GUF-fish oil (Fish Oil) 1,000 mg (120 mg-180 mg) capsule Take 1 Cap by mouth daily. Yes Provider, Historical   aspirin-acetaminophen-caffeine (EXCEDRIN MIGRAINE) 250-250-65 mg per tablet Take 2 Tabs by mouth every six (6) hours as needed for Headache. Yes Provider, Historical   loratadine (CLARITIN) 10 mg tablet Take 10 mg by mouth. Yes Provider, Historical   aspirin delayed-release 81 mg tablet Take 81 mg by mouth daily. Yes Provider, Historical   fluticasone (FLONASE ALLERGY RELIEF) 50 mcg/actuation nasal spray 2 Sprays by Both Nostrils route daily. Yes Provider, Historical   multivitamin capsule Take 1 Cap by mouth daily.    Yes Provider, Historical     Allergies   Allergen Reactions    Lisinopril Cough    Pcn [Penicillins] Unknown (comments)     Remembers something as a child about PCN       Past Medical History:   Diagnosis Date    Acquired hypothyroidism 6/11/2018    Depression with anxiety 2/15/2018    Essential hypertension 2/15/2018    Hx of arterial ischemic stroke 2/15/2018    Mixed hyperlipidemia 2/15/2018    Noncompliance 11/14/2019    Other ill-defined conditions(799.89) 2010    elevated PSA 6 months ago    Prediabetes 10/16/2018    Stroke [facility])      Past Surgical History:   Procedure Laterality Date    HX BACK SURGERY  2003    ? repaired slipped disc    HX TONSILLECTOMY      childhood     Objective:   Vital Signs: (As obtained by patient/caregiver at home)  There were no vitals taken for this visit. Constitutional: [x] Appears well-developed and well-nourished [x] No apparent distress        Mental status: [x] Alert and awake  [x] Oriented to person/place/time [x] Able to follow commands      Eyes:   EOM    [x]  Normal      Sclera  [x]  Normal              Discharge [x]  None visible       HENT: [x] Normocephalic, atraumatic    [] Mouth/Throat: Mucous membranes are moist    External Ears [x] Normal      Neck: [x] No visualized mass     Pulmonary/Chest: [x] Respiratory effort normal   [x] No visualized signs of difficulty breathing or respiratory distress           Musculoskeletal:   [x] Normal gait with no signs of ataxia         [x] Normal range of motion of neck         Neurological:        [x] No Facial Asymmetry (Cranial nerve 7 motor function) (limited exam due to video visit)          [x] No gaze palsy              Skin:        [x] No significant exanthematous lesions or discoloration noted on facial skin                  Psychiatric:       [x] Normal Affect [] Abnormal -        [x] No Hallucinations      We discussed the expected course, resolution and complications of the diagnosis(es) in detail. Medication risks, benefits, costs, interactions, and alternatives were discussed as indicated.   I advised him to contact the office if his condition worsens, changes or fails to improve as anticipated. He expressed understanding with the diagnosis(es) and plan. Axel Adorno is a 77 y.o. male being evaluated by a video visit encounter for concerns as above. A caregiver was present when appropriate. Due to this being a TeleHealth encounter (During [facility]-30 public health emergency), evaluation of the following organ systems was limited: Vitals/Constitutional/EENT/Resp/CV/GI//MS/Neuro/Skin/Heme-Lymph-Imm. Pursuant to the emergency declaration under the [facility], 1135 waiver authority and the [employer] Act, this Virtual  Visit was conducted, with patient's (and/or legal guardian's) consent, to reduce the patient's risk of exposure to COVID-19 and provide necessary medical care. Services were provided through a video synchronous discussion virtually to substitute for in-person clinic visit. Patient and provider were located at their individual homes.

## 2021-03-25 NOTE — PROGRESS NOTES
Chief Complaint   Patient presents with    Follow Up Chronic Condition       1. Have you been to the ER, urgent care clinic since your last visit? Hospitalized since your last visit? No    2. Have you seen or consulted any other health care providers outside of the [address] [place] [address] since your last visit? Include any pap smears or colon screening.  Yes When: neurologist

## 2021-05-22 DIAGNOSIS — F41.8 DEPRESSION WITH ANXIETY: ICD-10-CM

## 2021-05-25 RX ORDER — SERTRALINE HYDROCHLORIDE 100 MG/1
TABLET, FILM COATED ORAL
Qty: 90 TABLET | Refills: 0 | Status: SHIPPED | OUTPATIENT
Start: 2021-05-25 | End: 2021-08-27

## 2021-08-27 DIAGNOSIS — I10 ESSENTIAL HYPERTENSION: ICD-10-CM

## 2021-08-27 DIAGNOSIS — Z71.3 WEIGHT LOSS COUNSELING, ENCOUNTER FOR: ICD-10-CM

## 2021-08-27 DIAGNOSIS — R35.0 BENIGN PROSTATIC HYPERPLASIA WITH URINARY FREQUENCY: ICD-10-CM

## 2021-08-27 DIAGNOSIS — F41.8 DEPRESSION WITH ANXIETY: ICD-10-CM

## 2021-08-27 DIAGNOSIS — N40.1 BENIGN PROSTATIC HYPERPLASIA WITH URINARY FREQUENCY: ICD-10-CM

## 2021-08-27 RX ORDER — SERTRALINE HYDROCHLORIDE 100 MG/1
TABLET, FILM COATED ORAL
Qty: 90 TABLET | Refills: 0 | Status: SHIPPED | OUTPATIENT
Start: 2021-08-27 | End: 2022-02-23 | Stop reason: SDUPTHER

## 2021-08-27 RX ORDER — TOPIRAMATE 25 MG/1
TABLET ORAL
Qty: 180 TABLET | Refills: 0 | Status: SHIPPED | OUTPATIENT
Start: 2021-08-27 | End: 2022-01-13 | Stop reason: SDUPTHER

## 2021-08-27 RX ORDER — TAMSULOSIN HYDROCHLORIDE 0.4 MG/1
CAPSULE ORAL
Qty: 90 CAPSULE | Refills: 0 | Status: SHIPPED | OUTPATIENT
Start: 2021-08-27 | End: 2022-01-13 | Stop reason: SDUPTHER

## 2021-08-27 RX ORDER — HYDROCHLOROTHIAZIDE 25 MG/1
TABLET ORAL
Qty: 90 TABLET | Refills: 0 | Status: SHIPPED | OUTPATIENT
Start: 2021-08-27 | End: 2022-01-13 | Stop reason: SDUPTHER

## 2021-11-25 DIAGNOSIS — Z71.3 WEIGHT LOSS COUNSELING, ENCOUNTER FOR: ICD-10-CM

## 2021-11-25 DIAGNOSIS — F41.8 DEPRESSION WITH ANXIETY: ICD-10-CM

## 2021-11-25 DIAGNOSIS — R35.0 BENIGN PROSTATIC HYPERPLASIA WITH URINARY FREQUENCY: ICD-10-CM

## 2021-11-25 DIAGNOSIS — I10 ESSENTIAL HYPERTENSION: ICD-10-CM

## 2021-11-25 DIAGNOSIS — N40.1 BENIGN PROSTATIC HYPERPLASIA WITH URINARY FREQUENCY: ICD-10-CM

## 2021-11-25 NOTE — LETTER
11/30/2021 12:49 PM    Mr. Justin Gaona [id number]      Dear Mr. Encinas:    Please call our office at [phone] and schedule a follow up appointment for your continued care. It's been over 8 months since last visit. Please call [phone] to schedule appointment.         Sincerely,      Linn Floyd MD

## 2021-11-26 RX ORDER — HYDROCHLOROTHIAZIDE 25 MG/1
TABLET ORAL
Qty: 90 TABLET | Refills: 0 | OUTPATIENT
Start: 2021-11-26

## 2021-11-26 RX ORDER — TOPIRAMATE 25 MG/1
TABLET ORAL
Qty: 180 TABLET | Refills: 0 | OUTPATIENT
Start: 2021-11-26

## 2021-11-26 RX ORDER — SERTRALINE HYDROCHLORIDE 100 MG/1
TABLET, FILM COATED ORAL
Qty: 90 TABLET | Refills: 0 | OUTPATIENT
Start: 2021-11-26

## 2021-11-26 RX ORDER — TAMSULOSIN HYDROCHLORIDE 0.4 MG/1
CAPSULE ORAL
Qty: 90 CAPSULE | Refills: 0 | OUTPATIENT
Start: 2021-11-26

## 2021-11-26 NOTE — TELEPHONE ENCOUNTER
Pt last seen almost 9 months ago    Meds refill refused. Appointment needed    Thanks.      Mann Rea MD  11/26/2021

## 2022-01-10 ENCOUNTER — TELEPHONE (OUTPATIENT)
Dept: CARDIOLOGY CLINIC | Age: 67
End: 2022-01-10

## 2022-01-10 NOTE — TELEPHONE ENCOUNTER
Attempted to reach patient by telephone. A message was left for return call regarding refill for Rosuvastatin 40 mg.

## 2022-01-12 NOTE — TELEPHONE ENCOUNTER
Attempted to reach patient by telephone. A message was left for return call regarding refill for Rosuvastatin.

## 2022-01-13 ENCOUNTER — VIRTUAL VISIT (OUTPATIENT)
Dept: FAMILY MEDICINE CLINIC | Age: 67
End: 2022-01-13
Payer: MEDICARE

## 2022-01-13 DIAGNOSIS — Z91.199 NONCOMPLIANCE: ICD-10-CM

## 2022-01-13 DIAGNOSIS — I10 ESSENTIAL HYPERTENSION: Primary | ICD-10-CM

## 2022-01-13 DIAGNOSIS — E78.2 MIXED HYPERLIPIDEMIA: ICD-10-CM

## 2022-01-13 DIAGNOSIS — I10 ESSENTIAL HYPERTENSION: ICD-10-CM

## 2022-01-13 DIAGNOSIS — R35.0 BENIGN PROSTATIC HYPERPLASIA WITH URINARY FREQUENCY: ICD-10-CM

## 2022-01-13 DIAGNOSIS — Z12.11 SCREEN FOR COLON CANCER: ICD-10-CM

## 2022-01-13 DIAGNOSIS — R73.03 PREDIABETES: ICD-10-CM

## 2022-01-13 DIAGNOSIS — N40.1 BENIGN PROSTATIC HYPERPLASIA WITH URINARY FREQUENCY: ICD-10-CM

## 2022-01-13 DIAGNOSIS — Z79.899 ENCOUNTER FOR LONG-TERM (CURRENT) USE OF MEDICATIONS: ICD-10-CM

## 2022-01-13 DIAGNOSIS — E03.9 ACQUIRED HYPOTHYROIDISM: ICD-10-CM

## 2022-01-13 DIAGNOSIS — Z71.3 WEIGHT LOSS COUNSELING, ENCOUNTER FOR: ICD-10-CM

## 2022-01-13 PROCEDURE — 3017F COLORECTAL CA SCREEN DOC REV: CPT | Performed by: FAMILY MEDICINE

## 2022-01-13 PROCEDURE — G8427 DOCREV CUR MEDS BY ELIG CLIN: HCPCS | Performed by: FAMILY MEDICINE

## 2022-01-13 PROCEDURE — G8756 NO BP MEASURE DOC: HCPCS | Performed by: FAMILY MEDICINE

## 2022-01-13 PROCEDURE — G9717 DOC PT DX DEP/BP F/U NT REQ: HCPCS | Performed by: FAMILY MEDICINE

## 2022-01-13 PROCEDURE — 1101F PT FALLS ASSESS-DOCD LE1/YR: CPT | Performed by: FAMILY MEDICINE

## 2022-01-13 PROCEDURE — 99214 OFFICE O/P EST MOD 30 MIN: CPT | Performed by: FAMILY MEDICINE

## 2022-01-13 PROCEDURE — G8421 BMI NOT CALCULATED: HCPCS | Performed by: FAMILY MEDICINE

## 2022-01-13 PROCEDURE — G8536 NO DOC ELDER MAL SCRN: HCPCS | Performed by: FAMILY MEDICINE

## 2022-01-13 RX ORDER — METOPROLOL SUCCINATE 50 MG/1
TABLET, EXTENDED RELEASE ORAL
Qty: 90 TABLET | Refills: 1 | Status: SHIPPED | OUTPATIENT
Start: 2022-01-13

## 2022-01-13 RX ORDER — TAMSULOSIN HYDROCHLORIDE 0.4 MG/1
0.4 CAPSULE ORAL DAILY
Qty: 90 CAPSULE | Refills: 1 | Status: SHIPPED | OUTPATIENT
Start: 2022-01-13

## 2022-01-13 RX ORDER — TOPIRAMATE 25 MG/1
25 TABLET ORAL 2 TIMES DAILY WITH MEALS
Qty: 180 TABLET | Refills: 1 | Status: SHIPPED | OUTPATIENT
Start: 2022-01-13

## 2022-01-13 RX ORDER — HYDROCHLOROTHIAZIDE 25 MG/1
25 TABLET ORAL DAILY
Qty: 90 TABLET | Refills: 1 | Status: SHIPPED | OUTPATIENT
Start: 2022-01-13

## 2022-01-13 RX ORDER — METOPROLOL SUCCINATE 50 MG/1
TABLET, EXTENDED RELEASE ORAL
Qty: 90 TABLET | Refills: 0 | OUTPATIENT
Start: 2022-01-13

## 2022-01-13 RX ORDER — LEVOTHYROXINE SODIUM 100 UG/1
TABLET ORAL
Qty: 90 TABLET | Refills: 1 | Status: SHIPPED | OUTPATIENT
Start: 2022-01-13 | End: 2022-04-27

## 2022-01-13 RX ORDER — ROSUVASTATIN CALCIUM 40 MG/1
40 TABLET, COATED ORAL
Qty: 90 TABLET | Refills: 1 | Status: SHIPPED | OUTPATIENT
Start: 2022-01-13

## 2022-01-13 NOTE — PROGRESS NOTES
Consent: Chelsey Jean Baptiste, who was seen by synchronous (real-time) audio-video technology, and/or his healthcare decision maker, is aware that this patient-initiated, Telehealth encounter on 1/13/2022 is a billable service, with coverage as determined by his insurance carrier. He is aware that he may receive a bill and has provided verbal consent to proceed: Yes. Chelsey Jean Baptiste is a 74 y.o. male    He's chonically noncompliant. Noncompliant to meds, f/up, labs order or x-rays. Last saw him >10months ago. He NO showed 2 apt with me.      Vaccinated COVID     has a past medical history of Acquired hypothyroidism (6/11/2018), Depression with anxiety (2/15/2018), Essential hypertension (2/15/2018), arterial ischemic stroke (2/15/2018), Mixed hyperlipidemia (2/15/2018), Noncompliance (11/14/2019), Other ill-defined conditions(799.89) (2010), Prediabetes (10/16/2018), and Stroke ([facility] [address]    Hypothyroidism: tsh slight high. Does feel tired, sluggishness. I had increased Levothyroxine to 100mcg  He didn't do f/u labs      HTN: BP at home at goal  Continue meds      Depression anxiety: He report being stable, not taking meds  Denies SI or HI  zoloft 100mg he doesn't want to take anymore                Hypogonadism: symptoms include, chronic fatigue, depressed mood, decreased libido, ED, lack of energy.    We tried it for about 3 shots, didn't notice that it helped.  But he's not sexually active so doesn't want it anymore.      Prediabetes a1C 5.7% 08/2020, 5.8% 11/2019     Weight loss, can't control appetite,   topamax 25mg he said takes it once feels weird not taken it again.      HLD: crestor 40mg     Saw dr. Dareil Jim cardiologist had cardiac work up.    Work up was normal but he's higher risk due to his age, lifestyle and chronic medical conditions.      JACKIE on CPAP    Never did colonoscopy, we've done referral  He's ok w la, will order      Reviewed: active problem list, medication list, allergies, notes from last encounter, lab results    A comprehensive review of systems was negative except for that written in the HPI. Assessment & Plan:   Diagnoses and all orders for this visit:    1. Essential hypertension  -     metoprolol succinate (TOPROL-XL) 50 mg XL tablet; TAKE ONE TABLET BY MOUTH ONE TIME DAILY  -     hydroCHLOROthiazide (HYDRODIURIL) 25 mg tablet; Take 1 Tablet by mouth daily.  -     METABOLIC PANEL, COMPREHENSIVE; Future  -     TSH 3RD GENERATION; Future    2. Mixed hyperlipidemia  -     rosuvastatin (CRESTOR) 40 mg tablet; Take 1 Tablet by mouth nightly. -     METABOLIC PANEL, COMPREHENSIVE; Future  -     TSH 3RD GENERATION; Future    3. Acquired hypothyroidism  -     levothyroxine (SYNTHROID) 100 mcg tablet; TAKE ONE TABLET BY MOUTH ONE TIME DAILY BEFORE BREAKFAST  -     TSH 3RD GENERATION; Future    4. Prediabetes  -     HEMOGLOBIN A1C WITH EAG; Future    5. Noncompliance    6. Weight loss counseling, encounter for  -     topiramate (TOPAMAX) 25 mg tablet; Take 1 Tablet by mouth two (2) times daily (with meals). 7. Benign prostatic hyperplasia with urinary frequency  -     tamsulosin (FLOMAX) 0.4 mg capsule; Take 1 Capsule by mouth daily. 8. Encounter for long-term (current) use of medications  -     METABOLIC PANEL, COMPREHENSIVE; Future  -     HEMOGLOBIN A1C WITH EAG; Future  -     CBC W/O DIFF; Future  -     TSH 3RD GENERATION; Future    9. Screen for colon cancer  -     COLOGUARD TEST (FECAL DNA COLORECTAL CANCER SCREENING)      Follow-up and Dispositions    · Return in about 6 months (around 7/13/2022) for HTN, HLD, prediabetes, thyroid, meds, labs. 712  Subjective:   Marilee Murray is a 74 y.o. male who was seen for Follow Up Chronic Condition      Prior to Admission medications    Medication Sig Start Date End Date Taking?  Authorizing Provider   metoprolol succinate (TOPROL-XL) 50 mg XL tablet TAKE ONE TABLET BY MOUTH ONE TIME DAILY 1/13/22  Yes Rubina Pairsh MD topiramate (TOPAMAX) 25 mg tablet Take 1 Tablet by mouth two (2) times daily (with meals). 1/13/22  Yes Feliz Boast, MD   tamsulosin (FLOMAX) 0.4 mg capsule Take 1 Capsule by mouth daily. 1/13/22  Yes Feliz Boast, MD   hydroCHLOROthiazide (HYDRODIURIL) 25 mg tablet Take 1 Tablet by mouth daily. 1/13/22  Yes Feliz Boast, MD   levothyroxine (SYNTHROID) 100 mcg tablet TAKE ONE TABLET BY MOUTH ONE TIME DAILY BEFORE BREAKFAST 1/13/22  Yes Mercedes Hollingsworth MD   rosuvastatin (CRESTOR) 40 mg tablet Take 1 Tablet by mouth nightly. 1/13/22  Yes Feliz Boast, MD   cholecalciferol (VITAMIN D3) (5000 Units/125 mcg) tab tablet Take 1 Tab by mouth daily. 12/8/20  Yes Matthew Raman MD   omega 8-WJQ-IGO-fish oil (Fish Oil) 1,000 mg (120 mg-180 mg) capsule Take 1 Cap by mouth daily. Yes Provider, Historical   aspirin-acetaminophen-caffeine (EXCEDRIN MIGRAINE) 250-250-65 mg per tablet Take 2 Tabs by mouth every six (6) hours as needed for Headache. Yes Provider, Historical   loratadine (CLARITIN) 10 mg tablet Take 10 mg by mouth. Yes Provider, Historical   aspirin delayed-release 81 mg tablet Take 81 mg by mouth daily. Yes Provider, Historical   fluticasone (FLONASE ALLERGY RELIEF) 50 mcg/actuation nasal spray 2 Sprays by Both Nostrils route daily. Yes Provider, Historical   multivitamin capsule Take 1 Cap by mouth daily. Yes Provider, Historical   sertraline (ZOLOFT) 100 mg tablet TAKE ONE TABLET BY MOUTH ONE TIME DAILY  Patient not taking: Reported on 1/13/2022 8/27/21   Feliz Boast, MD     Allergies   Allergen Reactions    Lisinopril Cough    Pcn [Penicillins] Unknown (comments)     Remembers something as a child about PCN         Objective:   Vital Signs: (As obtained by patient/caregiver at home)  There were no vitals taken for this visit.      Constitutional: [x] Appears well-developed and well-nourished [x] No apparent distress        Mental status: [x] Alert and awake  [x] Oriented to person/place/time [x] Able to follow commands      Eyes:   EOM    [x]  Normal      Sclera  [x]  Normal              Discharge [x]  None visible       HENT: [x] Normocephalic, atraumatic    [] Mouth/Throat: Mucous membranes are moist    External Ears [x] Normal      Neck: [x] No visualized mass     Pulmonary/Chest: [x] Respiratory effort normal   [x] No visualized signs of difficulty breathing or respiratory distress           Musculoskeletal:   [x] Normal gait with no signs of ataxia         [x] Normal range of motion of neck         Neurological:        [x] No Facial Asymmetry (Cranial nerve 7 motor function) (limited exam due to video visit)          [x] No gaze palsy              Skin:        [x] No significant exanthematous lesions or discoloration noted on facial skin                  Psychiatric:       [x] Normal Affect [] Abnormal -        [x] No Hallucinations      We discussed the expected course, resolution and complications of the diagnosis(es) in detail. Medication risks, benefits, costs, interactions, and alternatives were discussed as indicated. I advised him to contact the office if his condition worsens, changes or fails to improve as anticipated. He expressed understanding with the diagnosis(es) and plan. Ansley Arora is a 74 y.o. male being evaluated by a video visit encounter for concerns as above. A caregiver was present when appropriate. Due to this being a TeleHealth encounter (During [facility]- public health emergency), evaluation of the following organ systems was limited: Vitals/Constitutional/EENT/Resp/CV/GI//MS/Neuro/Skin/Heme-Lymph-Imm.   Pursuant to the emergency declaration under the [facility], 1135 waiver authority and the [employer] Act, this Virtual  Visit was conducted, with patient's (and/or legal guardian's) consent, to reduce the patient's risk of exposure to COVID-19 and provide necessary medical care. Services were provided through a video synchronous discussion virtually to substitute for in-person clinic visit. Patient and provider were located at their individual homes.         Ramón Greenwood MD

## 2022-01-13 NOTE — PROGRESS NOTES
Chief Complaint   Patient presents with    Follow Up Chronic Condition     Check meds    1. Have you been to the ER, urgent care clinic since your last visit? Hospitalized since your last visit? No     2. Have you seen or consulted any other health care providers outside of the [address] [place] [address] since your last visit? Include any pap smears or colon screening.  Yes

## 2022-01-13 NOTE — TELEPHONE ENCOUNTER
Pt last seen almost 11 months ago    Meds refill refused. Appointment needed    Thanks.      Linh Portillo MD  1/13/2022

## 2022-02-02 DIAGNOSIS — R19.5 POSITIVE COLORECTAL CANCER SCREENING USING COLOGUARD TEST: Primary | ICD-10-CM

## 2022-02-23 ENCOUNTER — TRANSCRIBE ORDER (OUTPATIENT)
Dept: SCHEDULING | Age: 67
End: 2022-02-23

## 2022-02-23 DIAGNOSIS — R13.10 DYSPHAGIA: ICD-10-CM

## 2022-02-23 DIAGNOSIS — F41.8 DEPRESSION WITH ANXIETY: ICD-10-CM

## 2022-02-23 DIAGNOSIS — R19.5 HEME POSITIVE STOOL: Primary | ICD-10-CM

## 2022-02-23 RX ORDER — SERTRALINE HYDROCHLORIDE 100 MG/1
100 TABLET, FILM COATED ORAL DAILY
Qty: 30 TABLET | Refills: 0 | Status: SHIPPED | OUTPATIENT
Start: 2022-02-23 | End: 2022-03-25 | Stop reason: SDUPTHER

## 2022-03-18 PROBLEM — R73.03 PREDIABETES: Status: ACTIVE | Noted: 2018-10-16

## 2022-03-18 PROBLEM — Z91.199 NONCOMPLIANCE: Status: ACTIVE | Noted: 2019-11-14

## 2022-03-18 PROBLEM — F41.8 DEPRESSION WITH ANXIETY: Status: ACTIVE | Noted: 2018-02-15

## 2022-03-19 PROBLEM — E78.2 MIXED HYPERLIPIDEMIA: Status: ACTIVE | Noted: 2018-02-15

## 2022-03-19 PROBLEM — E03.9 ACQUIRED HYPOTHYROIDISM: Status: ACTIVE | Noted: 2018-06-11

## 2022-03-20 PROBLEM — I10 ESSENTIAL HYPERTENSION: Status: ACTIVE | Noted: 2018-02-15

## 2022-03-20 PROBLEM — Z86.73 HX OF ARTERIAL ISCHEMIC STROKE: Status: ACTIVE | Noted: 2018-02-15

## 2022-03-25 DIAGNOSIS — F41.8 DEPRESSION WITH ANXIETY: ICD-10-CM

## 2022-03-25 NOTE — TELEPHONE ENCOUNTER
Last visit:1/13/22 (virtual visit)  Next visit: not scheduled  Previous refill 2/23/22(30+0R)    Requested Prescriptions     Pending Prescriptions Disp Refills    sertraline (ZOLOFT) 100 mg tablet 30 Tablet 0     Sig: Take 1 Tablet by mouth daily.

## 2022-03-28 RX ORDER — SERTRALINE HYDROCHLORIDE 100 MG/1
100 TABLET, FILM COATED ORAL DAILY
Qty: 90 TABLET | Refills: 0 | Status: SHIPPED | OUTPATIENT
Start: 2022-03-28

## 2022-04-27 DIAGNOSIS — E03.9 ACQUIRED HYPOTHYROIDISM: ICD-10-CM

## 2022-04-27 RX ORDER — LEVOTHYROXINE SODIUM 100 UG/1
TABLET ORAL
Qty: 90 TABLET | Refills: 0 | Status: SHIPPED | OUTPATIENT
Start: 2022-04-27

## 2022-05-09 ENCOUNTER — TRANSCRIBE ORDER (OUTPATIENT)
Dept: SCHEDULING | Age: 67
End: 2022-05-09

## 2022-05-09 ENCOUNTER — HOSPITAL ENCOUNTER (OUTPATIENT)
Dept: GENERAL RADIOLOGY | Age: 67
Discharge: HOME | End: 2022-05-09
Attending: NURSE PRACTITIONER
Payer: MEDICARE

## 2022-05-09 DIAGNOSIS — R19.5 HEME POSITIVE STOOL: ICD-10-CM

## 2022-05-09 DIAGNOSIS — R13.10 DYSPHAGIA: ICD-10-CM

## 2022-05-09 PROCEDURE — 74220 X-RAY XM ESOPHAGUS 1CNTRST: CPT

## 2022-05-20 ENCOUNTER — ANESTHESIA (OUTPATIENT)
Dept: ENDOSCOPY | Age: 67
End: 2022-05-20
Payer: MEDICARE

## 2022-05-20 ENCOUNTER — ANESTHESIA EVENT (OUTPATIENT)
Dept: ENDOSCOPY | Age: 67
End: 2022-05-20
Payer: MEDICARE

## 2022-05-20 ENCOUNTER — HOSPITAL ENCOUNTER (OUTPATIENT)
Age: 67
Setting detail: OUTPATIENT SURGERY
Discharge: HOME | End: 2022-05-20
Attending: INTERNAL MEDICINE | Admitting: INTERNAL MEDICINE
Payer: MEDICARE

## 2022-05-20 VITALS
WEIGHT: 227 LBS | OXYGEN SATURATION: 94 % | HEART RATE: 67 BPM | SYSTOLIC BLOOD PRESSURE: 141 MMHG | TEMPERATURE: 97.1 F | DIASTOLIC BLOOD PRESSURE: 119 MMHG | BODY MASS INDEX: 32.5 KG/M2 | HEIGHT: 70 IN | RESPIRATION RATE: 18 BRPM

## 2022-05-20 PROCEDURE — 77030013992 HC SNR POLYP ENDOSC BSC -B: Performed by: INTERNAL MEDICINE

## 2022-05-20 PROCEDURE — 76040000019: Performed by: INTERNAL MEDICINE

## 2022-05-20 PROCEDURE — 74011250636 HC RX REV CODE- 250/636: Performed by: NURSE ANESTHETIST, CERTIFIED REGISTERED

## 2022-05-20 PROCEDURE — 74011250636 HC RX REV CODE- 250/636: Performed by: INTERNAL MEDICINE

## 2022-05-20 PROCEDURE — 88305 TISSUE EXAM BY PATHOLOGIST: CPT

## 2022-05-20 PROCEDURE — 2709999900 HC NON-CHARGEABLE SUPPLY: Performed by: INTERNAL MEDICINE

## 2022-05-20 PROCEDURE — 74011000250 HC RX REV CODE- 250: Performed by: NURSE ANESTHETIST, CERTIFIED REGISTERED

## 2022-05-20 PROCEDURE — 76060000031 HC ANESTHESIA FIRST 0.5 HR: Performed by: INTERNAL MEDICINE

## 2022-05-20 RX ORDER — DEXTROMETHORPHAN/PSEUDOEPHED 2.5-7.5/.8
1.2 DROPS ORAL
Status: DISCONTINUED | OUTPATIENT
Start: 2022-05-20 | End: 2022-05-20 | Stop reason: HOSPADM

## 2022-05-20 RX ORDER — FLUMAZENIL 0.1 MG/ML
0.2 INJECTION INTRAVENOUS
Status: DISCONTINUED | OUTPATIENT
Start: 2022-05-20 | End: 2022-05-20 | Stop reason: HOSPADM

## 2022-05-20 RX ORDER — SODIUM CHLORIDE 9 MG/ML
100 INJECTION, SOLUTION INTRAVENOUS CONTINUOUS
Status: DISCONTINUED | OUTPATIENT
Start: 2022-05-20 | End: 2022-05-20 | Stop reason: HOSPADM

## 2022-05-20 RX ORDER — MIDAZOLAM HYDROCHLORIDE 1 MG/ML
.25-5 INJECTION, SOLUTION INTRAMUSCULAR; INTRAVENOUS
Status: DISCONTINUED | OUTPATIENT
Start: 2022-05-20 | End: 2022-05-20 | Stop reason: HOSPADM

## 2022-05-20 RX ORDER — SODIUM CHLORIDE 0.9 % (FLUSH) 0.9 %
5-40 SYRINGE (ML) INJECTION EVERY 8 HOURS
Status: DISCONTINUED | OUTPATIENT
Start: 2022-05-20 | End: 2022-05-20 | Stop reason: HOSPADM

## 2022-05-20 RX ORDER — SODIUM CHLORIDE 0.9 % (FLUSH) 0.9 %
5-40 SYRINGE (ML) INJECTION AS NEEDED
Status: DISCONTINUED | OUTPATIENT
Start: 2022-05-20 | End: 2022-05-20 | Stop reason: HOSPADM

## 2022-05-20 RX ORDER — PROPOFOL 10 MG/ML
INJECTION, EMULSION INTRAVENOUS AS NEEDED
Status: DISCONTINUED | OUTPATIENT
Start: 2022-05-20 | End: 2022-05-20 | Stop reason: HOSPADM

## 2022-05-20 RX ORDER — NALOXONE HYDROCHLORIDE 0.4 MG/ML
0.4 INJECTION, SOLUTION INTRAMUSCULAR; INTRAVENOUS; SUBCUTANEOUS
Status: DISCONTINUED | OUTPATIENT
Start: 2022-05-20 | End: 2022-05-20 | Stop reason: HOSPADM

## 2022-05-20 RX ORDER — KETAMINE HYDROCHLORIDE 10 MG/ML
INJECTION, SOLUTION INTRAMUSCULAR; INTRAVENOUS AS NEEDED
Status: DISCONTINUED | OUTPATIENT
Start: 2022-05-20 | End: 2022-05-20 | Stop reason: HOSPADM

## 2022-05-20 RX ORDER — ATROPINE SULFATE 0.1 MG/ML
0.5 INJECTION INTRAVENOUS
Status: DISCONTINUED | OUTPATIENT
Start: 2022-05-20 | End: 2022-05-20 | Stop reason: HOSPADM

## 2022-05-20 RX ADMIN — PROPOFOL 50 MG: 10 INJECTION, EMULSION INTRAVENOUS at 12:30

## 2022-05-20 RX ADMIN — PROPOFOL 100 MG: 10 INJECTION, EMULSION INTRAVENOUS at 12:28

## 2022-05-20 RX ADMIN — KETAMINE HYDROCHLORIDE 40 MG: 10 INJECTION, SOLUTION INTRAMUSCULAR; INTRAVENOUS at 12:28

## 2022-05-20 RX ADMIN — PROPOFOL 20 MG: 10 INJECTION, EMULSION INTRAVENOUS at 12:40

## 2022-05-20 RX ADMIN — PROPOFOL 30 MG: 10 INJECTION, EMULSION INTRAVENOUS at 12:35

## 2022-05-20 RX ADMIN — SODIUM CHLORIDE 100 ML/HR: 9 INJECTION, SOLUTION INTRAVENOUS at 12:10

## 2022-05-20 NOTE — H&P
Radha Gurrola MD  [facility], [address] [phone]  [email or web address]      Gastroenterology Outpatient History and Physical    Patient: Gilbertothiago Dent    Physician: Thomasina Angelucci., MD    Vital Signs: Blood pressure (!) 153/96, pulse 76, temperature 98.3 °F (36.8 °C), resp. rate 19, height 5' 10\" (1.778 m), weight 103 kg (227 lb), SpO2 95 %. Allergies: Allergies   Allergen Reactions    Lisinopril Cough    Pcn [Penicillins] Unknown (comments)     Remembers something as a child about PCN       Chief Complaint: heme positive stool    History of Present Illness: Here for colonoscopy to evaluate positive Cologuard test.    History:  Past Medical History:   Diagnosis Date    Acquired hypothyroidism 6/11/2018    Depression with anxiety 2/15/2018    Essential hypertension 2/15/2018    Hiatal hernia     Hx of arterial ischemic stroke 2015    swallowing difficulties when tired    Migraines     Mixed hyperlipidemia 2/15/2018    Noncompliance 11/14/2019    Other ill-defined conditions(799.89) 2010    elevated PSA 6 months ago    Prediabetes 10/16/2018    Stroke [facility])       Past Surgical History:   Procedure Laterality Date    HX BACK SURGERY  2003    ? repaired slipped disc    HX COLONOSCOPY      HX TONSILLECTOMY      childhood      Social History     Socioeconomic History    Marital status:    Tobacco Use    Smoking status: Never Smoker    Smokeless tobacco: Never Used   Vaping Use    Vaping Use: Never used   Substance and Sexual Activity    Alcohol use: Yes     Comment: occasional    Drug use: Yes     Types: Prescription, OTC, Marijuana      Family History   Problem Relation Age of Onset    Diabetes Father     Heart Attack Father     Cancer Brother 48        prostate      Patient Active Problem List   Diagnosis Code    Depression with anxiety F41.8    Mixed hyperlipidemia E78.2    Essential hypertension I10    Hx of arterial ischemic stroke Z86.73    Acquired hypothyroidism E03.9    Prediabetes R73.03    Noncompliance Z91.19         Medications:   Prior to Admission medications    Medication Sig Start Date End Date Taking? Authorizing Provider   levothyroxine (SYNTHROID) 100 mcg tablet TAKE ONE TABLET BY MOUTH ONE TIME DAILY BEFORE BREAKFAST 4/27/22  Yes Josefa Hollingsworth MD   metoprolol succinate (TOPROL-XL) 50 mg XL tablet TAKE ONE TABLET BY MOUTH ONE TIME DAILY 1/13/22  Yes Mihir Parson MD   topiramate (TOPAMAX) 25 mg tablet Take 1 Tablet by mouth two (2) times daily (with meals). 1/13/22  Yes Mihir Parson MD   tamsulosin (FLOMAX) 0.4 mg capsule Take 1 Capsule by mouth daily. 1/13/22  Yes Mihir Parson MD   hydroCHLOROthiazide (HYDRODIURIL) 25 mg tablet Take 1 Tablet by mouth daily. 1/13/22  Yes Mihir Parson MD   rosuvastatin (CRESTOR) 40 mg tablet Take 1 Tablet by mouth nightly. 1/13/22  Yes Mihir Parson MD   cholecalciferol (VITAMIN D3) (5000 Units/125 mcg) tab tablet Take 1 Tab by mouth daily. 12/8/20  Yes Chiquita Dasilva MD   omega 0-SBP-JFO-fish oil (Fish Oil) 1,000 mg (120 mg-180 mg) capsule Take 1 Cap by mouth daily. Yes Provider, Historical   aspirin-acetaminophen-caffeine (EXCEDRIN MIGRAINE) 250-250-65 mg per tablet Take 2 Tabs by mouth every six (6) hours as needed for Headache. Yes Provider, Historical   loratadine (CLARITIN) 10 mg tablet Take 10 mg by mouth. Yes Provider, Historical   aspirin delayed-release 81 mg tablet Take 81 mg by mouth daily. Yes Provider, Historical   fluticasone (FLONASE ALLERGY RELIEF) 50 mcg/actuation nasal spray 2 Sprays by Both Nostrils route daily. Yes Provider, Historical   multivitamin capsule Take 1 Cap by mouth daily. Yes Provider, Historical   sertraline (ZOLOFT) 100 mg tablet Take 1 Tablet by mouth daily.   Patient not taking: Reported on 5/20/2022 3/28/22   Mihir Parson MD       Physical Exam: General: well developed, well nourished   HEENT: unremarkable   Heart: regular rhythm no mumur    Lungs: clear   Abdominal:  benign   Neurological: unremarkable   Extremities: no edema     Findings/Diagnosis: Positive Cologuard    Plan of Care/Planned Procedure: Colonoscopy with  monitored anesthesia care sedation       Signed:  Guicho Kay MD 5/20/2022

## 2022-05-20 NOTE — DISCHARGE INSTRUCTIONS
Geronimo Parrish MD  [facility], [address], [facility] [address]  [place], [address] [facility]  [phone]  [email or web address]    Baltazar De Los Santos  [id number]  1955    COLON DISCHARGE INSTRUCTIONS  Discomfort:  Redness at IV site- apply warm compress to area; if redness or soreness persist- contact your physician  There may be a slight amount of blood passed from the rectum  Gaseous discomfort- walking, belching will help relieve any discomfort  You may not operate a vehicle for 12 hours  You may not engage in an occupation involving machinery or appliances for rest of today  You may not drink alcoholic beverages for at least 12 hours  Avoid making any critical decisions for at least 24 hour  DIET:   High fiber diet. - however -  remember your colon is empty and a heavy meal will produce gas. Avoid these foods:  vegetables, fried / greasy foods, carbonated drinks for today      ACTIVITY:  You may resume your normal daily activities it is recommended that you spend the remainder of the day resting -  avoid any strenuous activity. CALL M.D. ANY SIGN OF:   Increasing pain, nausea, vomiting  Abdominal distension (swelling)  New increased bleeding (oral or rectal)  Fever (chills)  Pain in chest area  Bloody discharge from nose or mouth  Shortness of breath     COLONOSCOPY FINDINGS:  Your colonoscopy showed: two polyps which we removed, one small and one medium sized. Also have some internal hemorrhoids and mild diverticulosis  . Follow-up Instructions:   Call Dr. Geronimo Parrish if any questions or problems. Telephone # [phone]  Dr. Bashir Fountain office will notify you of the biopsy results within 7 to 10 days. Should have a repeat colonoscopy in 3 years.

## 2022-05-20 NOTE — PROGRESS NOTES
Endoscope was pre-cleaned at bedside immediately following procedure by HELEN Dexter Glasses returned to patient immediately post-procedure.

## 2022-05-20 NOTE — ANESTHESIA PREPROCEDURE EVALUATION
Relevant Problems   NEUROLOGY   (+) Depression with anxiety      CARDIOVASCULAR   (+) Essential hypertension      ENDOCRINE   (+) Acquired hypothyroidism       Anesthetic History   No history of anesthetic complications            Review of Systems / Medical History  Patient summary reviewed, nursing notes reviewed and pertinent labs reviewed    Pulmonary  Within defined limits                 Neuro/Psych       CVA  TIA, headaches (Migraines) and psychiatric history (Depression, Anxiety)     Cardiovascular    Hypertension          Hyperlipidemia      Comments: Nuclear Stress Test (12/8/20):  ·Gated SPECT: Left ventricular function post-stress was normal. Calculated ejection fraction is 66%. There is no evidence of transient ischemic dilation (TID). The TID ratio is 1.08.  ·Baseline ECG: Normal sinus rhythm, non-specific ST-T wave abnormalities. ·Myocardial perfusion imaging defect 1: caused by soft tissue. ·Left ventricular perfusion is normal. SDS=1  ·Myocardial perfusion imaging supports a low risk stress test.    TTE (12/8/20):  ·LV: Estimated LVEF is 60 - 65%. Biplane method used to measure ejection fraction. Normal cavity size and systolic function (ejection fraction normal). Mild concentric hypertrophy. Wall motion: normal. Mild (grade 1) left ventricular diastolic dysfunction. ·AV: Probably trileaflet aortic valve. ·AO: Mild ascending aorta dilatation. Ascending aorta diameter = 4.4 cm. ECG (11/5/20):   Sinus  Rhythm   WITHIN NORMAL LIMITS       GI/Hepatic/Renal           Hiatal hernia    Comments: Dysphagia  Heme positive stool Endo/Other      Hypothyroidism      Comments: Prediabetes Other Findings   Comments: Marijuana use    Noncompliance           Physical Exam    Airway  Mallampati: II  TM Distance: > 6 cm  Neck ROM: normal range of motion   Mouth opening: Normal     Cardiovascular  Regular rate and rhythm,  S1 and S2 normal,  no murmur, click, rub, or gallop             Dental  No notable dental hx       Pulmonary  Breath sounds clear to auscultation               Abdominal  GI exam deferred       Other Findings            Anesthetic Plan    ASA: 3  Anesthesia type: MAC          Induction: Intravenous  Anesthetic plan and risks discussed with: Patient

## 2022-05-20 NOTE — ANESTHESIA POSTPROCEDURE EVALUATION
Procedure(s):  COLONOSCOPY  ENDOSCOPIC POLYPECTOMY. MAC    Anesthesia Post Evaluation        Patient location during evaluation: PACU  Note status: Adequate. Level of consciousness: responsive to verbal stimuli and sleepy but conscious  Pain management: satisfactory to patient  Airway patency: patent  Anesthetic complications: no  Cardiovascular status: acceptable  Respiratory status: acceptable  Hydration status: acceptable  Comments: +Post-Anesthesia Evaluation and Assessment    Patient: Martina Hamlin MRN: [id number]  SSN: [id number]   YOB: 1955  Age: 74 y.o. Sex: male      Cardiovascular Function/Vital Signs    /82   Pulse 68   Temp 36.8 °C (98.3 °F)   Resp 17   Ht 5' 10\" (1.778 m)   Wt 103 kg (227 lb)   SpO2 97%   BMI 32.57 kg/m²     Patient is status post Procedure(s):  COLONOSCOPY  ENDOSCOPIC POLYPECTOMY. Nausea/Vomiting: Controlled. Postoperative hydration reviewed and adequate. Pain:  Pain Scale 1: Numeric (0 - 10) (05/20/22 1208)  Pain Intensity 1: 0 (05/20/22 1208)   Managed. Neurological Status: At baseline. Mental Status and Level of Consciousness: Arousable. Pulmonary Status:   O2 Device: Nasal cannula (05/20/22 1246)   Adequate oxygenation and airway patent. Complications related to anesthesia: None    Post-anesthesia assessment completed. No concerns. Signed By: Vanita Liriano MD    5/20/2022  Post anesthesia nausea and vomiting:  controlled      INITIAL Post-op Vital signs: No vitals data found for the desired time range.

## 2022-05-20 NOTE — PROCEDURES
[facility]                  Colonoscopy Operative Report    5/20/2022      Patrice Mclean  [id number]  1955    Procedure Type:   Colonoscopy --diagnostic     Indications:    Positive Cologuard     Pre-operative Diagnosis: see indication above    Post-operative Diagnosis:  See findings below    :  Barbie Bonds MD    Referring Provider: Violet Ramírez MD      Sedation:  MAC anesthesia Propofol    Pre-Procedural Exam:      Airway: clear,  No airway problems anticipated  Heart: RRR, without gallops or rubs  Lungs: clear bilaterally without wheezes, crackles, or rhonchi  Abdomen: soft, nontender, nondistended, bowel sounds present  Mental Status: awake, alert and oriented to person, place and time     Procedure Details:  After informed consent was obtained with all risks and benefits of procedure explained and preoperative exam completed, the patient was taken to the endoscopy suite and placed in the left lateral decubitus position. Upon sequential sedation as per above, a digital rectal exam was performed . The Olympus videocolonoscope  was inserted in the rectum and carefully advanced to the cecum, which was identified by the ileocecal valve and appendiceal orifice. The cecum was identified by the ileocecal valve and appendiceal orifice. The quality of preparation was good. The colonoscope was slowly withdrawn with careful evaluation between folds. Retroflexion in the rectum was completed demonstrating internal hemorrhoids. Findings:   Rectum: Grade 1 internal hemorrhoid(s); Sigmoid: 10 mm polyp on short stalk, hot snared. Diverticulosis  Descending Colon: normal  Transverse Colon: 5 mm polyp, cold snare  Ascending Colon: normal  Cecum: normal  Terminal Ileum: not intubated      Specimen Removed:  1. Transverse colon polyp  2 sigmoid polyp    Complications: None. EBL:  None.     Impression:    diverticulosis,  Mild in degree, involving the sigmoid  hemorrhoids internal, Moderate in size  Two colonic polyps as above    Recommendations: --Await pathology. , -Repeat colonoscopy in 3 years. High fiber diet. Resume normal medication(s). Discharge Disposition:  Home in the company of a  when able to ambulate. Vernell Baptiste MD    5/20/2022     FEROZ Starks MD  [facility], [address] [place] [address]  [phone]  [email or web address]

## 2025-08-20 ENCOUNTER — OFFICE VISIT (OUTPATIENT)
Age: 70
End: 2025-08-20
Payer: MEDICARE

## 2025-08-20 VITALS
WEIGHT: 221.4 LBS | OXYGEN SATURATION: 96 % | SYSTOLIC BLOOD PRESSURE: 110 MMHG | HEART RATE: 76 BPM | DIASTOLIC BLOOD PRESSURE: 82 MMHG | RESPIRATION RATE: 18 BRPM | BODY MASS INDEX: 31.7 KG/M2 | HEIGHT: 70 IN

## 2025-08-20 DIAGNOSIS — Z86.69 HISTORY OF BELL'S PALSY: ICD-10-CM

## 2025-08-20 DIAGNOSIS — Z78.9 INTOLERANCE OF CONTINUOUS POSITIVE AIRWAY PRESSURE (CPAP) VENTILATION: ICD-10-CM

## 2025-08-20 DIAGNOSIS — G47.33 OSA (OBSTRUCTIVE SLEEP APNEA): Primary | ICD-10-CM

## 2025-08-20 PROCEDURE — 1123F ACP DISCUSS/DSCN MKR DOCD: CPT | Performed by: OTOLARYNGOLOGY

## 2025-08-20 PROCEDURE — 3079F DIAST BP 80-89 MM HG: CPT | Performed by: OTOLARYNGOLOGY

## 2025-08-20 PROCEDURE — 99204 OFFICE O/P NEW MOD 45 MIN: CPT | Performed by: OTOLARYNGOLOGY

## 2025-08-20 PROCEDURE — 1159F MED LIST DOCD IN RCRD: CPT | Performed by: OTOLARYNGOLOGY

## 2025-08-20 PROCEDURE — 3074F SYST BP LT 130 MM HG: CPT | Performed by: OTOLARYNGOLOGY

## 2025-08-20 ASSESSMENT — ENCOUNTER SYMPTOMS
STRIDOR: 0
APNEA: 1
VOICE CHANGE: 0
EYE ITCHING: 0
SINUS PRESSURE: 0
COUGH: 0
RHINORRHEA: 0
SORE THROAT: 0
EYE DISCHARGE: 0
TROUBLE SWALLOWING: 0
SINUS PAIN: 0
SHORTNESS OF BREATH: 0
ABDOMINAL PAIN: 0
NAUSEA: 0
CHOKING: 0
VOMITING: 0
BACK PAIN: 0

## (undated) DEVICE — SET ADMIN 16ML TBNG L100IN 2 Y INJ SITE IV PIGGY BK DISP

## (undated) DEVICE — NEONATAL-ADULT SPO2 SENSOR: Brand: NELLCOR

## (undated) DEVICE — Z DISCONTINUED PER MEDLINE LINE GAS SAMPLING O2/CO2 LNG AD 13 FT NSL W/ TBNG FILTERLINE

## (undated) DEVICE — HYPODERMIC SAFETY NEEDLE: Brand: MAGELLAN

## (undated) DEVICE — NON-REM POLYHESIVE PATIENT RETURN ELECTRODE: Brand: VALLEYLAB

## (undated) DEVICE — Device

## (undated) DEVICE — SNARE ENDOSCP M L240CM W27MM SHTH DIA2.4MM CHN 2.8MM OVL

## (undated) DEVICE — CONTAINER SPEC 20 ML LID NEUT BUFF FORMALIN 10 % POLYPR STS

## (undated) DEVICE — BASIN EMSIS 16OZ GRAPHITE PLAS KID SHP MOLD GRAD FOR ORAL

## (undated) DEVICE — TOWEL 4 PLY TISS 19X30 SUE WHT

## (undated) DEVICE — 1200 GUARD II KIT W/5MM TUBE W/O VAC TUBE: Brand: GUARDIAN

## (undated) DEVICE — TRAP,MUCUS SPECIMEN, 80CC: Brand: MEDLINE

## (undated) DEVICE — SYR 10ML LUER LOK 1/5ML GRAD --

## (undated) DEVICE — SOLIDIFIER FLD 2OZ 1500CC N DISINF IN BTL DISP SAFESORB

## (undated) DEVICE — STRAINER URIN CALC RNL MSH -- CONVERT TO ITEM 357634

## (undated) DEVICE — CATH IV AUTOGRD BC PNK 20GA 25 -- INSYTE

## (undated) DEVICE — SYR 3ML LL TIP 1/10ML GRAD --

## (undated) DEVICE — ELECTRODE,RADIOTRANSLUCENT,FOAM,5PK: Brand: MEDLINE